# Patient Record
Sex: MALE | Race: WHITE | NOT HISPANIC OR LATINO | Employment: OTHER | URBAN - NONMETROPOLITAN AREA
[De-identification: names, ages, dates, MRNs, and addresses within clinical notes are randomized per-mention and may not be internally consistent; named-entity substitution may affect disease eponyms.]

---

## 2020-11-19 ENCOUNTER — OFFICE VISIT (OUTPATIENT)
Dept: FAMILY MEDICINE CLINIC | Facility: CLINIC | Age: 70
End: 2020-11-19

## 2020-11-19 ENCOUNTER — LAB (OUTPATIENT)
Dept: LAB | Facility: HOSPITAL | Age: 70
End: 2020-11-19

## 2020-11-19 VITALS
DIASTOLIC BLOOD PRESSURE: 70 MMHG | WEIGHT: 22.4 LBS | HEIGHT: 74 IN | OXYGEN SATURATION: 97 % | TEMPERATURE: 98 F | SYSTOLIC BLOOD PRESSURE: 128 MMHG | HEART RATE: 75 BPM | BODY MASS INDEX: 2.87 KG/M2

## 2020-11-19 DIAGNOSIS — I48.11 LONGSTANDING PERSISTENT ATRIAL FIBRILLATION (HCC): ICD-10-CM

## 2020-11-19 DIAGNOSIS — I10 ESSENTIAL HYPERTENSION: ICD-10-CM

## 2020-11-19 DIAGNOSIS — Z12.5 SCREENING FOR PROSTATE CANCER: ICD-10-CM

## 2020-11-19 DIAGNOSIS — E78.5 HYPERLIPIDEMIA, UNSPECIFIED HYPERLIPIDEMIA TYPE: ICD-10-CM

## 2020-11-19 DIAGNOSIS — Z12.11 ENCOUNTER FOR SCREENING FECAL OCCULT BLOOD TESTING: ICD-10-CM

## 2020-11-19 DIAGNOSIS — Z00.00 ANNUAL PHYSICAL EXAM: Primary | ICD-10-CM

## 2020-11-19 DIAGNOSIS — M25.511 ACUTE PAIN OF RIGHT SHOULDER: ICD-10-CM

## 2020-11-19 DIAGNOSIS — R19.4 CHANGE IN BOWEL HABITS: ICD-10-CM

## 2020-11-19 DIAGNOSIS — J44.9 CHRONIC OBSTRUCTIVE PULMONARY DISEASE, UNSPECIFIED COPD TYPE (HCC): ICD-10-CM

## 2020-11-19 DIAGNOSIS — F41.9 ANXIETY: ICD-10-CM

## 2020-11-19 DIAGNOSIS — E55.9 VITAMIN D DEFICIENCY: ICD-10-CM

## 2020-11-19 PROCEDURE — 99204 OFFICE O/P NEW MOD 45 MIN: CPT | Performed by: FAMILY MEDICINE

## 2020-11-19 PROCEDURE — 80061 LIPID PANEL: CPT | Performed by: FAMILY MEDICINE

## 2020-11-19 PROCEDURE — 82274 ASSAY TEST FOR BLOOD FECAL: CPT | Performed by: FAMILY MEDICINE

## 2020-11-19 PROCEDURE — 85027 COMPLETE CBC AUTOMATED: CPT | Performed by: FAMILY MEDICINE

## 2020-11-19 PROCEDURE — 80053 COMPREHEN METABOLIC PANEL: CPT | Performed by: FAMILY MEDICINE

## 2020-11-19 PROCEDURE — G0103 PSA SCREENING: HCPCS | Performed by: FAMILY MEDICINE

## 2020-11-19 RX ORDER — SERTRALINE HYDROCHLORIDE 100 MG/1
100 TABLET, FILM COATED ORAL DAILY
Qty: 90 TABLET | Refills: 3 | Status: SHIPPED | OUTPATIENT
Start: 2020-11-19 | End: 2021-12-10

## 2020-11-19 RX ORDER — LISINOPRIL 20 MG/1
10 TABLET ORAL DAILY
COMMUNITY
End: 2023-01-01

## 2020-11-19 RX ORDER — CYANOCOBALAMIN (VITAMIN B-12) 5000 MCG
5000 TABLET,IMMED, EXTENDED RELEASE, BIPHASIC ORAL DAILY
COMMUNITY
End: 2023-01-01

## 2020-11-19 RX ORDER — ASCORBIC ACID 500 MG
1000 TABLET ORAL DAILY
COMMUNITY

## 2020-11-19 RX ORDER — L.RHAMNOSUS/B.ANIMALIS(LACTIS) 3B CELL
1 CAPSULE ORAL DAILY
Qty: 30 CAPSULE | Refills: 11 | Status: SHIPPED | OUTPATIENT
Start: 2020-11-19 | End: 2023-01-01

## 2020-11-19 RX ORDER — SERTRALINE HYDROCHLORIDE 100 MG/1
100 TABLET, FILM COATED ORAL DAILY
COMMUNITY
End: 2020-11-19 | Stop reason: SDUPTHER

## 2020-11-19 RX ORDER — CHLORAL HYDRATE 500 MG
1000 CAPSULE ORAL
COMMUNITY
End: 2023-01-01

## 2020-11-19 RX ORDER — DILTIAZEM HYDROCHLORIDE 120 MG/1
120 CAPSULE, EXTENDED RELEASE ORAL DAILY
COMMUNITY

## 2020-11-19 RX ORDER — SIMVASTATIN 80 MG
40 TABLET ORAL NIGHTLY
COMMUNITY

## 2020-11-19 RX ORDER — BUDESONIDE AND FORMOTEROL FUMARATE DIHYDRATE 160; 4.5 UG/1; UG/1
2 AEROSOL RESPIRATORY (INHALATION)
COMMUNITY

## 2020-11-19 NOTE — PROGRESS NOTES
Marian VANEGAS Case is a 70 y.o. male.     Chief Complaint   Patient presents with   • Establish Care             History of Present Illness     New patient,  and has been going to [place] to see a provider.     He has hyperlipidemia, htn, a-fib on xarelto, anxiety, copd and vit d deficiency.     Psh:  Some injection procedure to lumbar disc  Sh:  Quit smoking 15 years ago.  He smoked 1.5ppd for 47 years. No etoh, drugs.  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   Fh:  Denies prostate cancer and colon cancer.  Dad  age 72 heart disease, mom  age 80's just worn out.     Thrown off 4 miramontes 2 weeks ago, some right shoulder discomfort.      He believes colonoscopy greater than 10 years ago.    Few weeks, diarrhea or constipation.         Review of Systems   Constitutional: Negative for chills, fatigue and fever.   HENT: Negative for congestion, ear discharge, ear pain, facial swelling, hearing loss, postnasal drip, rhinorrhea, sinus pressure, sore throat, trouble swallowing and voice change.    Eyes: Negative for discharge, redness and visual disturbance.   Respiratory: Negative for cough, chest tightness, shortness of breath and wheezing.    Cardiovascular: Negative for chest pain and palpitations.   Gastrointestinal: Negative for abdominal pain, blood in stool, constipation, diarrhea, nausea and vomiting.   Endocrine: Negative for polydipsia and polyuria.   Genitourinary: Negative for dysuria, flank pain, hematuria and urgency.   Musculoskeletal: Positive for arthralgias. Negative for back pain, joint swelling and myalgias.   Skin: Negative for rash.   Neurological: Negative for dizziness, weakness, numbness and headaches.   Hematological: Negative for adenopathy.   Psychiatric/Behavioral: Negative for confusion and sleep disturbance. The patient is not nervous/anxious.            /70 (BP Location: Left arm, Patient Position:  "Sitting, Cuff Size: Adult)   Pulse 75   Temp 98 °F (36.7 °C) (Infrared)   Ht 188 cm (74\")   Wt 10.2 kg (22 lb 6.4 oz)   SpO2 97%   BMI 2.88 kg/m²       Objective     Physical Exam  Vitals signs and nursing note reviewed.   Constitutional:       Appearance: Normal appearance. He is well-developed.   HENT:      Head: Normocephalic and atraumatic.      Right Ear: External ear normal.      Left Ear: External ear normal.      Nose: Nose normal. No rhinorrhea.   Eyes:      General: No scleral icterus.     Extraocular Movements: Extraocular movements intact.      Conjunctiva/sclera: Conjunctivae normal.      Pupils: Pupils are equal, round, and reactive to light.   Neck:      Musculoskeletal: Normal range of motion and neck supple.   Cardiovascular:      Rate and Rhythm: Normal rate and regular rhythm.      Heart sounds: Normal heart sounds. No friction rub. No gallop.    Pulmonary:      Effort: Pulmonary effort is normal.      Breath sounds: Normal breath sounds.   Abdominal:      General: Bowel sounds are normal. There is no distension.      Palpations: Abdomen is soft.      Tenderness: There is no abdominal tenderness.   Musculoskeletal: Normal range of motion.         General: No deformity.      Comments: Right ac joint tenderness palpation.  Has full rom.    Skin:     General: Skin is warm and dry.      Findings: No erythema or rash.   Neurological:      General: No focal deficit present.      Mental Status: He is alert and oriented to person, place, and time.      Cranial Nerves: No cranial nerve deficit.   Psychiatric:         Behavior: Behavior normal.         Thought Content: Thought content normal.         Judgment: Judgment normal.             PAST MEDICAL HISTORY   No past medical history on file.   PAST SURGICAL HISTORY   No past surgical history on file.   SOCIAL HISTORY     Social History     Socioeconomic History   • Marital status:      Spouse name: Not on file   • Number of children: Not on " file   • Years of education: Not on file   • Highest education level: Not on file   Tobacco Use   • Smoking status: Former Smoker     Packs/day: 1.50     Years: 47.00     Pack years: 70.50     Start date: 1958     Quit date: 2005     Years since quitting: 15.8      ALLERGIES   Morphine   MEDICATIONS     Current Outpatient Medications   Medication Sig Dispense Refill   • budesonide-formoterol (SYMBICORT) 160-4.5 MCG/ACT inhaler Inhale 2 puffs 2 (Two) Times a Day.     • cholecalciferol (VITAMIN D3) 1.25 MG (27062 UT) capsule Take 50,000 Units by mouth Daily.     • Cyanocobalamin ER (B-12 Dual Spectrum) 5000 MCG tablet controlled-release Take 5,000 mcg by mouth Daily.     • dilTIAZem (TIAZAC) 120 MG 24 hr capsule Take 120 mg by mouth Daily.     • lisinopril (PRINIVIL,ZESTRIL) 20 MG tablet Take 10 mg by mouth Daily.     • Omega-3 Fatty Acids (fish oil) 1000 MG capsule capsule Take 1,000 mg by mouth Daily With Breakfast.     • rivaroxaban (XARELTO) 20 MG tablet Take 20 mg by mouth Daily.     • sertraline (ZOLOFT) 100 MG tablet Take 1 tablet by mouth Daily. 90 tablet 3   • simvastatin (ZOCOR) 80 MG tablet Take 40 mg by mouth Every Night.     • tiotropium (SPIRIVA) 18 MCG per inhalation capsule Place 1 capsule into inhaler and inhale Daily.     • vitamin C (ASCORBIC ACID) 500 MG tablet Take 1,000 mg by mouth Daily.     • Probiotic Product ([employer]) capsule Take 1 capsule by mouth Daily. 30 capsule 11     No current facility-administered medications for this visit.         The following portions of the patient's history were reviewed and updated as appropriate: allergies, current medications, past family history, past medical history, past social history, past surgical history and problem list.        Assessment/Plan   Diagnoses and all orders for this visit:    1. Annual physical exam (Primary)  -     CBC (No Diff)  -     Comprehensive Metabolic Panel  -     Lipid Panel  -     PSA Screen    2. Screening for  prostate cancer  -     PSA Screen    3. Hyperlipidemia, unspecified hyperlipidemia type  -     Lipid Panel    4. Essential hypertension  -     CBC (No Diff)  -     Comprehensive Metabolic Panel    5. Longstanding persistent atrial fibrillation (CMS/HCC)    6. Anxiety    7. Chronic obstructive pulmonary disease, unspecified COPD type (CMS/HCC)    8. Vitamin D deficiency    9. Acute pain of right shoulder    10. Change in bowel habits    Other orders  -     sertraline (ZOLOFT) 100 MG tablet; Take 1 tablet by mouth Daily.  Dispense: 90 tablet; Refill: 3  -     Probiotic Product ([employer]) capsule; Take 1 capsule by mouth Daily.  Dispense: 30 capsule; Refill: 11      I suspect right ac joint arthritis.  More time to improve.  If not, refer to ortho.     STOOL CARD GIVEN AND EXPLAINED WHAT IT IS FOR.      For his change in bowel habits, try S*Bio.  Explained it alone would be a reason for a colonoscopy                No follow-ups on file.                  This document has been electronically signed by Ethan Harris MD on November 19, 2020 15:55 CST

## 2020-11-20 DIAGNOSIS — E83.52 HYPERCALCEMIA: Primary | ICD-10-CM

## 2020-11-20 LAB
ALBUMIN SERPL-MCNC: 3.7 G/DL (ref 3.5–5.2)
ALBUMIN/GLOB SERPL: 1 G/DL
ALP SERPL-CCNC: 71 U/L (ref 39–117)
ALT SERPL W P-5'-P-CCNC: 17 U/L (ref 1–41)
ANION GAP SERPL CALCULATED.3IONS-SCNC: 10.7 MMOL/L (ref 5–15)
AST SERPL-CCNC: 21 U/L (ref 1–40)
BILIRUB SERPL-MCNC: 0.2 MG/DL (ref 0–1.2)
BUN SERPL-MCNC: 24 MG/DL (ref 8–23)
BUN/CREAT SERPL: 18.8 (ref 7–25)
CALCIUM SPEC-SCNC: 10.7 MG/DL (ref 8.6–10.5)
CHLORIDE SERPL-SCNC: 104 MMOL/L (ref 98–107)
CHOLEST SERPL-MCNC: 165 MG/DL (ref 0–200)
CO2 SERPL-SCNC: 26.3 MMOL/L (ref 22–29)
CREAT SERPL-MCNC: 1.28 MG/DL (ref 0.76–1.27)
DEPRECATED RDW RBC AUTO: 42.9 FL (ref 37–54)
ERYTHROCYTE [DISTWIDTH] IN BLOOD BY AUTOMATED COUNT: 13.1 % (ref 12.3–15.4)
GFR SERPL CREATININE-BSD FRML MDRD: 55 ML/MIN/1.73
GLOBULIN UR ELPH-MCNC: 3.8 GM/DL
GLUCOSE SERPL-MCNC: 136 MG/DL (ref 65–99)
HCT VFR BLD AUTO: 41.9 % (ref 37.5–51)
HDLC SERPL-MCNC: 43 MG/DL (ref 40–60)
HGB BLD-MCNC: 14.4 G/DL (ref 13–17.7)
LDLC SERPL CALC-MCNC: 94 MG/DL (ref 0–100)
LDLC/HDLC SERPL: 2.1 {RATIO}
MCH RBC QN AUTO: 30.8 PG (ref 26.6–33)
MCHC RBC AUTO-ENTMCNC: 34.4 G/DL (ref 31.5–35.7)
MCV RBC AUTO: 89.5 FL (ref 79–97)
PLATELET # BLD AUTO: 242 10*3/MM3 (ref 140–450)
PMV BLD AUTO: 9.4 FL (ref 6–12)
POTASSIUM SERPL-SCNC: 3.7 MMOL/L (ref 3.5–5.2)
PROT SERPL-MCNC: 7.5 G/DL (ref 6–8.5)
PSA SERPL-MCNC: <0.014 NG/ML (ref 0–4)
RBC # BLD AUTO: 4.68 10*6/MM3 (ref 4.14–5.8)
SODIUM SERPL-SCNC: 141 MMOL/L (ref 136–145)
TRIGL SERPL-MCNC: 159 MG/DL (ref 0–150)
VLDLC SERPL-MCNC: 28 MG/DL (ref 5–40)
WBC # BLD AUTO: 6.61 10*3/MM3 (ref 3.4–10.8)

## 2020-12-23 DIAGNOSIS — R19.5 POSITIVE FIT (FECAL IMMUNOCHEMICAL TEST): Primary | ICD-10-CM

## 2020-12-23 LAB — HEMOCCULT STL QL IA: POSITIVE

## 2021-01-15 ENCOUNTER — LAB (OUTPATIENT)
Dept: LAB | Facility: HOSPITAL | Age: 71
End: 2021-01-15

## 2021-01-15 ENCOUNTER — OFFICE VISIT (OUTPATIENT)
Dept: GASTROENTEROLOGY | Facility: CLINIC | Age: 71
End: 2021-01-15

## 2021-01-15 VITALS
HEART RATE: 76 BPM | WEIGHT: 220.6 LBS | SYSTOLIC BLOOD PRESSURE: 108 MMHG | HEIGHT: 74 IN | DIASTOLIC BLOOD PRESSURE: 60 MMHG | BODY MASS INDEX: 28.31 KG/M2

## 2021-01-15 DIAGNOSIS — R19.4 CHANGE IN BOWEL HABITS: ICD-10-CM

## 2021-01-15 DIAGNOSIS — E83.52 HYPERCALCEMIA: Primary | ICD-10-CM

## 2021-01-15 DIAGNOSIS — R19.4 CHANGE IN BOWEL HABITS: Primary | ICD-10-CM

## 2021-01-15 DIAGNOSIS — E83.52 HYPERCALCEMIA: ICD-10-CM

## 2021-01-15 DIAGNOSIS — R19.5 POSITIVE FIT (FECAL IMMUNOCHEMICAL TEST): ICD-10-CM

## 2021-01-15 LAB — 25(OH)D3 SERPL-MCNC: 69.6 NG/ML (ref 30–100)

## 2021-01-15 PROCEDURE — 82306 VITAMIN D 25 HYDROXY: CPT

## 2021-01-15 PROCEDURE — 36415 COLL VENOUS BLD VENIPUNCTURE: CPT

## 2021-01-15 PROCEDURE — 99214 OFFICE O/P EST MOD 30 MIN: CPT | Performed by: INTERNAL MEDICINE

## 2021-01-15 RX ORDER — SODIUM, POTASSIUM,MAG SULFATES 17.5-3.13G
1 SOLUTION, RECONSTITUTED, ORAL ORAL EVERY 12 HOURS
Qty: 1 BOTTLE | Refills: 0 | Status: ON HOLD | OUTPATIENT
Start: 2021-01-15 | End: 2021-01-25

## 2021-01-15 RX ORDER — DEXTROSE AND SODIUM CHLORIDE 5; .45 G/100ML; G/100ML
30 INJECTION, SOLUTION INTRAVENOUS CONTINUOUS PRN
Status: CANCELLED | OUTPATIENT
Start: 2021-01-25

## 2021-01-15 NOTE — H&P (VIEW-ONLY)
[facility]      Chief Complaint:   Chief Complaint   Patient presents with   • POSITIVE FIT REF       Subjective     HPI:   Patient states change in bowel habits over the past 1 month with episodes of diarrhea and constipation.  Patient has had a colonoscopy greater than 10 years ago.  Patient had a fit test done by his primary doctor which was positive.    Plan; we will schedule patient for a colonoscopy next Friday.    Past Medical History:   History reviewed. No pertinent past medical history.    Past Surgical History:  Past Surgical History:   Procedure Laterality Date   • BACK SURGERY  1975       Family History:  History reviewed. No pertinent family history.    Social History:   reports that he quit smoking about 16 years ago. He started smoking about 63 years ago. He has a 70.50 pack-year smoking history. He does not have any smokeless tobacco history on file.    Medications:   Prior to Admission medications    Medication Sig Start Date End Date Taking? Authorizing Provider   budesonide-formoterol (SYMBICORT) 160-4.5 MCG/ACT inhaler Inhale 2 puffs 2 (Two) Times a Day.   Yes Alejandro Suggs MD   cholecalciferol (VITAMIN D3) 1.25 MG (67941 UT) capsule Take 50,000 Units by mouth Daily.   Yes Alejandro Suggs MD   Cyanocobalamin ER (B-12 Dual Spectrum) 5000 MCG tablet controlled-release Take 5,000 mcg by mouth Daily.   Yes Alejandro Suggs MD   dilTIAZem (TIAZAC) 120 MG 24 hr capsule Take 120 mg by mouth Daily.   Yes Alejandro Suggs MD   lisinopril (PRINIVIL,ZESTRIL) 20 MG tablet Take 10 mg by mouth Daily.   Yes Alejandro Suggs MD   Omega-3 Fatty Acids (fish oil) 1000 MG capsule capsule Take 1,000 mg by mouth Daily With Breakfast.   Yes Alejandro Suggs MD   Probiotic Product (Bulb) capsule Take 1 capsule by mouth Daily. 11/19/20  Yes Ethan Harris MD   rivaroxaban (XARELTO) 20 MG tablet Take 20 mg by mouth Daily.   Yes Es  "MD Alejandro   sertraline (ZOLOFT) 100 MG tablet Take 1 tablet by mouth Daily. 11/19/20  Yes Ethan Harris MD   simvastatin (ZOCOR) 80 MG tablet Take 40 mg by mouth Every Night.   Yes ProviderAlejandro MD   tiotropium (SPIRIVA) 18 MCG per inhalation capsule Place 1 capsule into inhaler and inhale Daily.   Yes ProviderAlejandro MD   vitamin C (ASCORBIC ACID) 500 MG tablet Take 1,000 mg by mouth Daily.   Yes ProviderAlejandro MD   sodium-potassium-magnesium sulfates (SUPREP) 17.5-3.13-1.6 GM/177ML solution oral solution Take 1 bottle by mouth Every 12 (Twelve) Hours. 1/15/21   Micah Adkins MD       Allergies:  Morphine    ROS:    Review of Systems   Constitutional: Negative for activity change, appetite change and unexpected weight change.   HENT: Negative for congestion, sore throat and trouble swallowing.    Respiratory: Negative for cough, choking and shortness of breath.    Cardiovascular: Negative for chest pain.   Gastrointestinal: Negative for abdominal distention, abdominal pain, anal bleeding, blood in stool, constipation, diarrhea, nausea, rectal pain and vomiting.   Endocrine: Negative for heat intolerance, polydipsia and polyphagia.   Genitourinary: Negative for difficulty urinating.   Musculoskeletal: Negative for arthralgias.   Skin: Negative for color change, pallor, rash and wound.   Allergic/Immunologic: Negative for food allergies.   Neurological: Negative for dizziness, syncope, weakness and headaches.   Psychiatric/Behavioral: Negative for agitation, behavioral problems, confusion and decreased concentration.     Objective     Blood pressure 108/60, pulse 76, height 188 cm (74\"), weight 100 kg (220 lb 9.6 oz).    Physical Exam  Constitutional:       General: He is not in acute distress.     Appearance: He is well-developed. He is not diaphoretic.   HENT:      Head: Normocephalic and atraumatic.   Cardiovascular:      Rate and Rhythm: Normal rate and regular rhythm.      Heart " sounds: Normal heart sounds. No murmur. No friction rub. No gallop.    Pulmonary:      Effort: No respiratory distress.      Breath sounds: Normal breath sounds. No wheezing or rales.   Chest:      Chest wall: No tenderness.   Abdominal:      General: Bowel sounds are normal. There is no distension.      Palpations: Abdomen is soft. There is no mass.      Tenderness: There is no abdominal tenderness. There is no guarding or rebound.      Hernia: No hernia is present.   Musculoskeletal: Normal range of motion.   Skin:     General: Skin is warm and dry.      Coloration: Skin is not pale.      Findings: No erythema or rash.   Neurological:      Mental Status: He is alert and oriented to person, place, and time.   Psychiatric:         Behavior: Behavior normal.         Thought Content: Thought content normal.         Judgment: Judgment normal.          Assessment/Plan   Diagnoses and all orders for this visit:    1. Change in bowel habits (Primary)  -     Case Request; Standing  -     dextrose 5 % and sodium chloride 0.45 % infusion  -     Case Request    2. Positive FIT (fecal immunochemical test)  -     Case Request; Standing  -     dextrose 5 % and sodium chloride 0.45 % infusion  -     Case Request    Other orders  -     Follow Anesthesia Guidelines / Standing Orders; Future  -     Obtain Informed Consent; Future  -     Implement Anesthesia Orders Day of Procedure; Standing  -     Obtain Informed Consent; Standing  -     POC Glucose Once; Standing  -     Insert Peripheral IV; Standing  -     sodium-potassium-magnesium sulfates (SUPREP) 17.5-3.13-1.6 GM/177ML solution oral solution; Take 1 bottle by mouth Every 12 (Twelve) Hours.  Dispense: 1 bottle; Refill: 0        COLONOSCOPY (N/A)     Diagnosis Plan   1. Change in bowel habits  Case Request    dextrose 5 % and sodium chloride 0.45 % infusion    Case Request   2. Positive FIT (fecal immunochemical test)  Case Request    dextrose 5 % and sodium chloride 0.45 %  infusion    Case Request       Anticipated Surgical Procedure:  Orders Placed This Encounter   Procedures   • Follow Anesthesia Guidelines / Standing Orders     Standing Status:   Future   • Obtain Informed Consent     Standing Status:   Future     Order Specific Question:   Informed Consent Given For     Answer:   colonoscopy       The risks, benefits, and alternatives of this procedure have been discussed with the patient or the responsible party- the patient understands and agrees to proceed.

## 2021-01-15 NOTE — PROGRESS NOTES
[facility], [place], [facility] by [facility]      Chief Complaint:   Chief Complaint   Patient presents with   • POSITIVE FIT REF       Subjective     HPI:   Patient states change in bowel habits over the past 1 month with episodes of diarrhea and constipation.  Patient has had a colonoscopy greater than 10 years ago.  Patient had a fit test done by his primary doctor which was positive.    Plan; we will schedule patient for a colonoscopy next Friday.    Past Medical History:   History reviewed. No pertinent past medical history.    Past Surgical History:  Past Surgical History:   Procedure Laterality Date   • BACK SURGERY  1975       Family History:  History reviewed. No pertinent family history.    Social History:   reports that he quit smoking about 16 years ago. He started smoking about 63 years ago. He has a 70.50 pack-year smoking history. He does not have any smokeless tobacco history on file.    Medications:   Prior to Admission medications    Medication Sig Start Date End Date Taking? Authorizing Provider   budesonide-formoterol (SYMBICORT) 160-4.5 MCG/ACT inhaler Inhale 2 puffs 2 (Two) Times a Day.   Yes Alejandro Suggs MD   cholecalciferol (VITAMIN D3) 1.25 MG (87716 UT) capsule Take 50,000 Units by mouth Daily.   Yes Alejandro Suggs MD   Cyanocobalamin ER (B-12 Dual Spectrum) 5000 MCG tablet controlled-release Take 5,000 mcg by mouth Daily.   Yes Alejandro Suggs MD   dilTIAZem (TIAZAC) 120 MG 24 hr capsule Take 120 mg by mouth Daily.   Yes Alejandro Suggs MD   lisinopril (PRINIVIL,ZESTRIL) 20 MG tablet Take 10 mg by mouth Daily.   Yes Alejandro Suggs MD   Omega-3 Fatty Acids (fish oil) 1000 MG capsule capsule Take 1,000 mg by mouth Daily With Breakfast.   Yes Alejandro Suggs MD   Probiotic Product (Melon) capsule Take 1 capsule by mouth Daily. 11/19/20  Yes Ethan Harris MD   rivaroxaban (XARELTO) 20 MG tablet Take 20 mg by mouth Daily.   Yes Es  "MD Alejandro   sertraline (ZOLOFT) 100 MG tablet Take 1 tablet by mouth Daily. 11/19/20  Yes Ethan Harris MD   simvastatin (ZOCOR) 80 MG tablet Take 40 mg by mouth Every Night.   Yes ProviderAlejandro MD   tiotropium (SPIRIVA) 18 MCG per inhalation capsule Place 1 capsule into inhaler and inhale Daily.   Yes ProviderAlejandro MD   vitamin C (ASCORBIC ACID) 500 MG tablet Take 1,000 mg by mouth Daily.   Yes ProviderAlejandro MD   sodium-potassium-magnesium sulfates (SUPREP) 17.5-3.13-1.6 GM/177ML solution oral solution Take 1 bottle by mouth Every 12 (Twelve) Hours. 1/15/21   Micah Adkins MD       Allergies:  Morphine    ROS:    Review of Systems   Constitutional: Negative for activity change, appetite change and unexpected weight change.   HENT: Negative for congestion, sore throat and trouble swallowing.    Respiratory: Negative for cough, choking and shortness of breath.    Cardiovascular: Negative for chest pain.   Gastrointestinal: Negative for abdominal distention, abdominal pain, anal bleeding, blood in stool, constipation, diarrhea, nausea, rectal pain and vomiting.   Endocrine: Negative for heat intolerance, polydipsia and polyphagia.   Genitourinary: Negative for difficulty urinating.   Musculoskeletal: Negative for arthralgias.   Skin: Negative for color change, pallor, rash and wound.   Allergic/Immunologic: Negative for food allergies.   Neurological: Negative for dizziness, syncope, weakness and headaches.   Psychiatric/Behavioral: Negative for agitation, behavioral problems, confusion and decreased concentration.     Objective     Blood pressure 108/60, pulse 76, height 188 cm (74\"), weight 100 kg (220 lb 9.6 oz).    Physical Exam  Constitutional:       General: He is not in acute distress.     Appearance: He is well-developed. He is not diaphoretic.   HENT:      Head: Normocephalic and atraumatic.   Cardiovascular:      Rate and Rhythm: Normal rate and regular rhythm.      Heart " sounds: Normal heart sounds. No murmur. No friction rub. No gallop.    Pulmonary:      Effort: No respiratory distress.      Breath sounds: Normal breath sounds. No wheezing or rales.   Chest:      Chest wall: No tenderness.   Abdominal:      General: Bowel sounds are normal. There is no distension.      Palpations: Abdomen is soft. There is no mass.      Tenderness: There is no abdominal tenderness. There is no guarding or rebound.      Hernia: No hernia is present.   Musculoskeletal: Normal range of motion.   Skin:     General: Skin is warm and dry.      Coloration: Skin is not pale.      Findings: No erythema or rash.   Neurological:      Mental Status: He is alert and oriented to person, place, and time.   Psychiatric:         Behavior: Behavior normal.         Thought Content: Thought content normal.         Judgment: Judgment normal.          Assessment/Plan   Diagnoses and all orders for this visit:    1. Change in bowel habits (Primary)  -     Case Request; Standing  -     dextrose 5 % and sodium chloride 0.45 % infusion  -     Case Request    2. Positive FIT (fecal immunochemical test)  -     Case Request; Standing  -     dextrose 5 % and sodium chloride 0.45 % infusion  -     Case Request    Other orders  -     Follow Anesthesia Guidelines / Standing Orders; Future  -     Obtain Informed Consent; Future  -     Implement Anesthesia Orders Day of Procedure; Standing  -     Obtain Informed Consent; Standing  -     POC Glucose Once; Standing  -     Insert Peripheral IV; Standing  -     sodium-potassium-magnesium sulfates (SUPREP) 17.5-3.13-1.6 GM/177ML solution oral solution; Take 1 bottle by mouth Every 12 (Twelve) Hours.  Dispense: 1 bottle; Refill: 0        COLONOSCOPY (N/A)     Diagnosis Plan   1. Change in bowel habits  Case Request    dextrose 5 % and sodium chloride 0.45 % infusion    Case Request   2. Positive FIT (fecal immunochemical test)  Case Request    dextrose 5 % and sodium chloride 0.45 %  infusion    Case Request       Anticipated Surgical Procedure:  Orders Placed This Encounter   Procedures   • Follow Anesthesia Guidelines / Standing Orders     Standing Status:   Future   • Obtain Informed Consent     Standing Status:   Future     Order Specific Question:   Informed Consent Given For     Answer:   colonoscopy       The risks, benefits, and alternatives of this procedure have been discussed with the patient or the responsible party- the patient understands and agrees to proceed.

## 2021-01-15 NOTE — PATIENT INSTRUCTIONS
"BMI for Adults  What is BMI?  Body mass index (BMI) is a number that is calculated from a person's weight and height. BMI can help estimate how much of a person's weight is composed of fat. BMI does not measure body fat directly. Rather, it is an alternative to procedures that directly measure body fat, which can be difficult and expensive.  BMI can help identify people who may be at higher risk for certain medical problems.  What are BMI measurements used for?  BMI is used as a screening tool to identify possible weight problems. It helps determine whether a person is obese, overweight, a healthy weight, or underweight.  BMI is useful for:  · Identifying a weight problem that may be related to a medical condition or may increase the risk for medical problems.  · Promoting changes, such as changes in diet and exercise, to help reach a healthy weight. BMI screening can be repeated to see if these changes are working.  How is BMI calculated?  BMI involves measuring your weight in relation to your height. Both height and weight are measured, and the BMI is calculated from those numbers. This can be done either in English (U.S[place] or metric measurements. Note that charts and online BMI calculators are available to help you find your BMI quickly and easily without having to do these calculations yourself.  To calculate your BMI in English (U.S[place] measurements:    1. Measure your weight in pounds (lb).  2. Multiply the number of pounds by 703.  ? For example, for a person who weighs 180 lb, multiply that number by 703, which equals 126,540.  3. Measure your height in inches. Then multiply that number by itself to get a measurement called \"inches squared.\"  ? For example, for a person who is 70 inches tall, the \"inches squared\" measurement is 70 inches x 70 inches, which equals 4,900 inches squared.  4. Divide the total from step 2 (number of lb x 703) by the total from step 3 (inches squared): 126,540 ÷ 4,900 = 25.8. This is " "your BMI.  To calculate your BMI in metric measurements:  1. Measure your weight in kilograms (kg).  2. Measure your height in meters (m). Then multiply that number by itself to get a measurement called \"meters squared.\"  ? For example, for a person who is 1.75 m tall, the \"meters squared\" measurement is 1.75 m x 1.75 m, which is equal to 3.1 meters squared.  3. Divide the number of kilograms (your weight) by the meters squared number. In this example: 70 ÷ 3.1 = 22.6. This is your BMI.  What do the results mean?  BMI charts are used to identify whether you are underweight, normal weight, overweight, or obese. The following guidelines will be used:  · Underweight: BMI less than 18.5.  · Normal weight: BMI between 18.5 and 24.9.  · Overweight: BMI between 25 and 29.9.  · Obese: BMI of 30 or above.  Keep these notes in mind:  · Weight includes both fat and muscle, so someone with a muscular build, such as an athlete, may have a BMI that is higher than 24.9. In cases like these, BMI is not an accurate measure of body fat.  · To determine if excess body fat is the cause of a BMI of 25 or higher, further assessments may need to be done by a health care provider.  · BMI is usually interpreted in the same way for men and women.  Where to find more information  For more information about BMI, including tools to quickly calculate your BMI, go to these websites:  · Centers for Disease Control and Prevention: www.cdc.gov  · American Heart Association: www.heart.org  · National Heart, Lung, and Blood Cost: www.nhlbi.nih.gov  Summary  · Body mass index (BMI) is a number that is calculated from a person's weight and height.  · BMI may help estimate how much of a person's weight is composed of fat. BMI can help identify those who may be at higher risk for certain medical problems.  · BMI can be measured using English measurements or metric measurements.  · BMI charts are used to identify whether you are underweight, normal " weight, overweight, or obese.  This information is not intended to replace advice given to you by your health care provider. Make sure you discuss any questions you have with your health care provider.  Document Revised: 09/09/2020 Document Reviewed: 07/17/2020  Elsevier Patient Education © 2020 [employer]

## 2021-01-18 DIAGNOSIS — E46 PROTEIN-CALORIE MALNUTRITION, UNSPECIFIED SEVERITY (HCC): Primary | ICD-10-CM

## 2021-01-18 DIAGNOSIS — E55.9 VITAMIN D DEFICIENCY: ICD-10-CM

## 2021-01-22 ENCOUNTER — LAB (OUTPATIENT)
Dept: LAB | Facility: HOSPITAL | Age: 71
End: 2021-01-22

## 2021-01-22 ENCOUNTER — APPOINTMENT (OUTPATIENT)
Dept: LAB | Facility: HOSPITAL | Age: 71
End: 2021-01-22

## 2021-01-22 DIAGNOSIS — Z01.818 PREOP TESTING: Primary | ICD-10-CM

## 2021-01-22 LAB — SARS-COV-2 ORF1AB RESP QL NAA+PROBE: NOT DETECTED

## 2021-01-22 PROCEDURE — C9803 HOPD COVID-19 SPEC COLLECT: HCPCS

## 2021-01-22 PROCEDURE — U0004 COV-19 TEST NON-CDC HGH THRU: HCPCS

## 2021-01-25 ENCOUNTER — ANESTHESIA (OUTPATIENT)
Dept: GASTROENTEROLOGY | Facility: HOSPITAL | Age: 71
End: 2021-01-25

## 2021-01-25 ENCOUNTER — ANESTHESIA EVENT (OUTPATIENT)
Dept: GASTROENTEROLOGY | Facility: HOSPITAL | Age: 71
End: 2021-01-25

## 2021-01-25 ENCOUNTER — HOSPITAL ENCOUNTER (OUTPATIENT)
Facility: HOSPITAL | Age: 71
Setting detail: HOSPITAL OUTPATIENT SURGERY
Discharge: HOME | End: 2021-01-25
Attending: INTERNAL MEDICINE | Admitting: INTERNAL MEDICINE

## 2021-01-25 VITALS
DIASTOLIC BLOOD PRESSURE: 65 MMHG | BODY MASS INDEX: 27.21 KG/M2 | SYSTOLIC BLOOD PRESSURE: 114 MMHG | WEIGHT: 212 LBS | HEART RATE: 86 BPM | OXYGEN SATURATION: 95 % | HEIGHT: 74 IN | TEMPERATURE: 97.8 F | RESPIRATION RATE: 14 BRPM

## 2021-01-25 DIAGNOSIS — R19.5 POSITIVE FIT (FECAL IMMUNOCHEMICAL TEST): ICD-10-CM

## 2021-01-25 DIAGNOSIS — R19.4 CHANGE IN BOWEL HABITS: ICD-10-CM

## 2021-01-25 PROCEDURE — 45378 DIAGNOSTIC COLONOSCOPY: CPT | Performed by: INTERNAL MEDICINE

## 2021-01-25 PROCEDURE — 25010000002 PROPOFOL 10 MG/ML EMULSION: Performed by: NURSE ANESTHETIST, CERTIFIED REGISTERED

## 2021-01-25 RX ORDER — DEXTROSE AND SODIUM CHLORIDE 5; .45 G/100ML; G/100ML
30 INJECTION, SOLUTION INTRAVENOUS CONTINUOUS PRN
Status: DISCONTINUED | OUTPATIENT
Start: 2021-01-25 | End: 2021-01-25 | Stop reason: HOSPADM

## 2021-01-25 RX ORDER — PROPOFOL 10 MG/ML
VIAL (ML) INTRAVENOUS AS NEEDED
Status: DISCONTINUED | OUTPATIENT
Start: 2021-01-25 | End: 2021-01-25 | Stop reason: SURG

## 2021-01-25 RX ORDER — LIDOCAINE HYDROCHLORIDE 20 MG/ML
INJECTION, SOLUTION INTRAVENOUS AS NEEDED
Status: DISCONTINUED | OUTPATIENT
Start: 2021-01-25 | End: 2021-01-25 | Stop reason: SURG

## 2021-01-25 RX ADMIN — LIDOCAINE HYDROCHLORIDE 60 MG: 20 INJECTION, SOLUTION INTRAVENOUS at 10:19

## 2021-01-25 RX ADMIN — DEXTROSE AND SODIUM CHLORIDE 30 ML/HR: 5; 450 INJECTION, SOLUTION INTRAVENOUS at 09:54

## 2021-01-25 RX ADMIN — PROPOFOL 20 MG: 10 INJECTION, EMULSION INTRAVENOUS at 10:25

## 2021-01-25 RX ADMIN — PROPOFOL 100 MG: 10 INJECTION, EMULSION INTRAVENOUS at 10:19

## 2021-01-25 RX ADMIN — PROPOFOL 20 MG: 10 INJECTION, EMULSION INTRAVENOUS at 10:22

## 2021-01-25 RX ADMIN — PROPOFOL 20 MG: 10 INJECTION, EMULSION INTRAVENOUS at 10:28

## 2021-01-25 NOTE — ANESTHESIA PREPROCEDURE EVALUATION
Anesthesia Evaluation     NPO Solid Status: > 8 hours  NPO Liquid Status: > 4 hours           Airway   Mallampati: II  TM distance: >3 FB  Neck ROM: full  Possible difficult intubation  Dental    (+) lower dentures and upper dentures    Pulmonary    (+) a smoker Former, COPD mild, decreased breath sounds,   Cardiovascular   Exercise tolerance: good (4-7 METS)    Rhythm: regular  Rate: normal    (-) pacemaker, hypertension, CAD      Neuro/Psych- negative ROS  GI/Hepatic/Renal/Endo    (-)  obesity, GERD    Musculoskeletal     (-) arthralgias  Abdominal    Substance History   (-) alcohol use     OB/GYN          Other                      Anesthesia Plan    ASA 2     MAC     intravenous induction     Anesthetic plan, all risks, benefits, and alternatives have been provided, discussed and informed consent has been obtained with: patient.

## 2021-01-25 NOTE — ANESTHESIA POSTPROCEDURE EVALUATION
Patient: Son Hughes Case    Procedure Summary     Date: 01/25/21 Room / Location: [facility] ENDOSCOPY 1 / [facility] ENDOSCOPY    Anesthesia Start: 1015 Anesthesia Stop: 1033    Procedure: COLONOSCOPY (N/A ) Diagnosis:       Change in bowel habits      Positive FIT (fecal immunochemical test)      (Change in bowel habits [R19.4])      (Positive FIT (fecal immunochemical test) [R19.5])    Surgeon: Micah Adkins MD Provider: Destiney Bell CRNA    Anesthesia Type: MAC ASA Status: 2          Anesthesia Type: MAC    Vitals  No vitals data found for the desired time range.          Post Anesthesia Care and Evaluation    Patient location during evaluation: bedside  Patient participation: waiting for patient participation  Level of consciousness: sleepy but conscious  Pain score: 0  Pain management: adequate  Airway patency: patent  Anesthetic complications: No anesthetic complications  PONV Status: none  Cardiovascular status: acceptable  Respiratory status: acceptable  Hydration status: acceptable

## 2021-09-02 ENCOUNTER — DOCUMENTATION (OUTPATIENT)
Dept: CARDIOLOGY | Facility: CLINIC | Age: 71
End: 2021-09-02

## 2021-09-02 ENCOUNTER — OFFICE VISIT (OUTPATIENT)
Dept: CARDIOLOGY | Facility: CLINIC | Age: 71
End: 2021-09-02

## 2021-09-02 VITALS
SYSTOLIC BLOOD PRESSURE: 116 MMHG | WEIGHT: 215 LBS | HEIGHT: 74 IN | HEART RATE: 75 BPM | OXYGEN SATURATION: 98 % | BODY MASS INDEX: 27.59 KG/M2 | DIASTOLIC BLOOD PRESSURE: 68 MMHG

## 2021-09-02 DIAGNOSIS — I48.91 ATRIAL FIBRILLATION, UNSPECIFIED TYPE (HCC): Primary | ICD-10-CM

## 2021-09-02 DIAGNOSIS — R06.09 DYSPNEA ON EXERTION: ICD-10-CM

## 2021-09-02 DIAGNOSIS — Z13.6 SCREENING FOR ABDOMINAL AORTIC ANEURYSM: ICD-10-CM

## 2021-09-02 DIAGNOSIS — I10 HYPERTENSION, UNSPECIFIED TYPE: ICD-10-CM

## 2021-09-02 PROCEDURE — 93000 ELECTROCARDIOGRAM COMPLETE: CPT | Performed by: INTERNAL MEDICINE

## 2021-09-02 PROCEDURE — 99204 OFFICE O/P NEW MOD 45 MIN: CPT | Performed by: INTERNAL MEDICINE

## 2021-09-02 NOTE — PROGRESS NOTES
[facility] Cardiology  OFFICE NOTE    Cardiovascular Medicine  Hernando Cao M.D., [facility]         Jyoti Burgos, APRN  [address] [place] [address]    Thank you for asking me to see Son Hughes Case for atrial fibrillation.    History of Present Illness    Son Brannon is a 71 y.o. male who presents for consultation today.  He reports history of hypertension, tobacco abuse (former, smoked 1 PPD of cigarettes for 35 years, quit 15 years ago), COPD, CKD and AAA (was told he had this many years ago, has not been reevaluated for many years).  [facility].    He denies having any prior history of open heart surgeries, coronary artery disease or any other rhythm problems other than atrial fibrillation.  Reports being diagnosed with atrial fibrillation many years ago and was started on Xarelto.  He has tolerated Xarelto without any significant bleeding problems.  He is on diltiazem for rate control.    He has developed significant exertional shortness of breath for the past year.  He is currently getting short of breath while walking up to his car from his house and even while going for a shower.  This promptly improved with rest.  He has not had any significant exertional chest discomfort.  He has not had any PND, orthopnea or leg swelling.  Denies having any syncopal episodes.  He tends to feel dizzy when he has a bout of coughing.    Review of Systems - ROS  Constitution: Negative for weakness, weight gain and weight loss.   HENT: Negative for congestion.    Eyes: Negative for blurred vision.   Cardiovascular: As mentioned above  Respiratory: Negative for cough and hemoptysis.    Endocrine: Negative for polydipsia and polyuria.   Hematologic/Lymphatic: Negative for bleeding problem. Does not bruise/bleed easily.   Skin: Negative for flushing.   Musculoskeletal: Negative for neck pain and stiffness.   Gastrointestinal: Negative for abdominal pain, diarrhea, jaundice, melena,  nausea and vomiting.   Genitourinary: Negative for dysuria and hematuria.   Neurological: Negative for focal weakness and numbness.   Psychiatric/Behavioral: Negative for altered mental status and depression.      All other systems were reviewed and were negative.    Past Medical History:   Diagnosis Date   • COPD (chronic obstructive pulmonary disease) (CMS/[facility])        Family History:  family history is not on file.    Social History:   reports that he quit smoking about 16 years ago. He started smoking about 63 years ago. He has a 70.50 pack-year smoking history. He has never used smokeless tobacco. He reports previous alcohol use. He reports that he does not use drugs.    Allergies:  Allergies   Allergen Reactions   • Contrast Dye Other (See Comments)     States made him sick   • Morphine Mental Status Change         Current Outpatient Medications:   •  budesonide-formoterol (SYMBICORT) 160-4.5 MCG/ACT inhaler, Inhale 2 puffs 2 (Two) Times a Day., Disp: , Rfl:   •  cholecalciferol (VITAMIN D3) 1.25 MG (22786 UT) capsule, Take 50,000 Units by mouth Daily., Disp: , Rfl:   •  Cyanocobalamin ER (B-12 Dual Spectrum) 5000 MCG tablet controlled-release, Take 5,000 mcg by mouth Daily., Disp: , Rfl:   •  dilTIAZem (TIAZAC) 120 MG 24 hr capsule, Take 120 mg by mouth Daily., Disp: , Rfl:   •  lisinopril (PRINIVIL,ZESTRIL) 20 MG tablet, Take 10 mg by mouth Daily., Disp: , Rfl:   •  Omega-3 Fatty Acids (fish oil) 1000 MG capsule capsule, Take 1,000 mg by mouth Daily With Breakfast., Disp: , Rfl:   •  Probiotic Product (Lumier) capsule, Take 1 capsule by mouth Daily., Disp: 30 capsule, Rfl: 11  •  rivaroxaban (XARELTO) 20 MG tablet, Take 20 mg by mouth Daily., Disp: , Rfl:   •  sertraline (ZOLOFT) 100 MG tablet, Take 1 tablet by mouth Daily., Disp: 90 tablet, Rfl: 3  •  simvastatin (ZOCOR) 80 MG tablet, Take 40 mg by mouth Every Night., Disp: , Rfl:   •  tiotropium (SPIRIVA) 18 MCG per inhalation capsule, Place  "1 capsule into inhaler and inhale Daily., Disp: , Rfl:   •  vitamin C (ASCORBIC ACID) 500 MG tablet, Take 1,000 mg by mouth Daily., Disp: , Rfl:     Physical Exam:  Vitals:    09/02/21 1024   BP: 116/68   BP Location: Left arm   Patient Position: Sitting   Cuff Size: Adult   Pulse: 75   SpO2: 98%   Weight: 97.5 kg (215 lb)   Height: 188 cm (74\")   PainSc: 0-No pain     Current Pain Level: none  Pulse Ox: Normal  on room air  General: alert, appears stated age and cooperative     Body Habitus: well-nourished    HEENT: Head: Normocephalic, no lesions, without obvious abnormality.    Neuro: alert, oriented x3  Pulses: 2+ and symmetric  JVP: Volume/Pulsation: Normal.  Normal waveforms.   Appropriate inspiratory decrease.    Carotid Exam: no bruit normal pulsation bilaterally   Carotid Volume: normal.     Respirations: no increased work of breathing   Chest:  Normal    Pulmonary:Normal   Precordium: Normal impulses. P2 is not palpable.  [place]:  normal size and placement  Palpable S4: absent.  Heart rate: normal    Heart Rhythm: regular     Heart Sounds: S1: normal  S2: normal  S3: absent   S4: absent  Opening Snap: absent    Pericardial Rub:  Absent: .    Abdomen:   Appearance: normal .  Palpation: Soft, non-tender to palpation, bowel sounds positive in all four quadrants; no guarding or rebound tenderness  Extremity: no edema.   LE Skin: no rashes  LE Hair:  normal  LE Pulses: well perfused with normal pulses in the distal extremities  Pallor on elevation: Absent. Rubor on dependency: None      DATA REVIEWED:     EKG. I personally reviewed and interpreted the EKG.  normal sinus rhythm, PAC's noted    ECG/EMG Results (all)     Procedure Component Value Units Date/Time    ECG 12 Lead [[id number]] Collected: 09/02/21 1017     Updated: 09/02/21 1043     QT Interval 358 ms      QTC Interval 405 ms     Narrative:      Test Reason : atrial fibrillation  Blood Pressure :   */*   mmHG  Vent. Rate :  77 BPM     Atrial Rate :  77 " BPM     P-R Int : 180 ms          QRS Dur :  76 ms      QT Int : 358 ms       P-R-T Axes :   6  39  41 degrees     QTc Int : 405 ms    Sinus rhythm with marked sinus arrhythmia  Otherwise normal ECG  No previous ECGs available    Referred By: SALOMÓN           Confirmed By:         ---------------------------------------------------  TTE/JD:    -----------------------------------------------------  CXR/Imaging:   Imaging Results (Most Recent)     None            -----------------------------------------------------  CT:   No radiology results for the last 30 days.    ----------------------------------------------------      --------------------------------------------------------------------------------------------------  LABS:     The 10-year CVD risk score (Fernanda et al., 2008) is: 24.7%    Values used to calculate the score:      Age: 71 years      Sex: Male      Diabetic: No      Tobacco smoker: No      Systolic Blood Pressure: 116 mmHg      Is BP treated: Yes      HDL Cholesterol: 43 mg/dL      Total Cholesterol: 165 mg/dL         Lab Results   Component Value Date    GLUCOSE 136 (H) 11/19/2020    BUN 24 (H) 11/19/2020    CREATININE 1.28 (H) 11/19/2020    EGFRIFNONA 55 (L) 11/19/2020    BCR 18.8 11/19/2020    K 3.7 11/19/2020    CO2 26.3 11/19/2020    CALCIUM 10.7 (H) 11/19/2020    ALBUMIN 3.70 11/19/2020    AST 21 11/19/2020    ALT 17 11/19/2020     Lab Results   Component Value Date    WBC 6.61 11/19/2020    HGB 14.4 11/19/2020    HCT 41.9 11/19/2020    MCV 89.5 11/19/2020     11/19/2020     Lab Results   Component Value Date    CHOL 165 11/19/2020    TRIG 159 (H) 11/19/2020    HDL 43 11/19/2020    LDL 94 11/19/2020     Lab Results   Component Value Date    TSH 2.02 08/10/2021     No results found for: CKTOTAL, CKMB, CKMBINDEX, TROPONINI, TROPONINT  No results found for: HGBA1C  No results found for: DDIMER  Lab Results   Component Value Date    ALT 17 11/19/2020     No results found for:  HGBA1C  Lab Results   Component Value Date    CREATININE 1.28 (H) 11/19/2020     No results found for: IRON, TIBC, FERRITIN  No results found for: INR, PROTIME    Assessment/Plan     1. Atrial fibrillation, unspecified type (CMS/HCC)  ECG done in clinic today showed normal sinus rhythm with PACs.  He was diagnosed with atrial fibrillation many years ago and was started on Xarelto.  He denies having any frequent palpitations.  We will obtain a transthoracic echocardiogram to look for underlying structural heart disease.  Serum TSH level was normal at 2.02 on 8/10/2021.  Continue diltiazem for rate control.  He is going to see pulmonary medicine at [facility] in the near future, and is going to ask them to evaluate him for KEVEN (he snores a lot at night).  His last creatinine level was 1.7 mg/dL on 8/10/2021, we will contact her pharmacy to determine the appropriate dose of Xarelto for him.  - ECG 12 Lead  - Adult Transthoracic Echo Complete w/ Color, Spectral and Contrast if Necessary Per Protocol; Future    2. Dyspnea on exertion  He has developed significant exertional dyspnea over the past year.  This occurs when he walks from his house to the car and even when he goes to take a shower.  We will obtain a transthoracic echocardiogram to look for underlying structural heart disease.  Will also obtain an exercise treadmill stress test to look for evidence of underlying myocardial ischemia.  - Treadmill Stress Test; Future  - Adult Transthoracic Echo Complete w/ Color, Spectral and Contrast if Necessary Per Protocol; Future    3. Hypertension, unspecified type  Clinic BP was 116/68 mmHg today.  We will continue current regimen of lisinopril.      4. Screening for abdominal aortic aneurysm  He was told he had an abdominal aortic aneurysm many years ago, this has not been reevaluated for the past several years.  Will obtain a screening ultrasound for AAA assessment.  - US AAA Screen Limited;  Future      Prevention:  Patient's Body mass index is 27.6 kg/m². indicating that he is overweight (BMI 25-29.9). We discussed portion control and increasing exercise..      Son Hughes Case  reports that he quit smoking about 16 years ago. He started smoking about 63 years ago. He has a 70.50 pack-year smoking history. He has never used smokeless tobacco.. I have educated him on the risk of diseases from using tobacco products such as cancer, COPD and heart disease.     I have congratulated him on successfully staying away from smoking for the past 15 years.      Return in about 3 weeks (around 9/23/2021).            Electronically signed by Hernando Cao MD on 09/02/21 at 11:06 CDT

## 2021-09-07 ENCOUNTER — HOSPITAL ENCOUNTER (OUTPATIENT)
Dept: ULTRASOUND IMAGING | Facility: HOSPITAL | Age: 71
Discharge: HOME | End: 2021-09-07
Admitting: INTERNAL MEDICINE

## 2021-09-07 ENCOUNTER — TELEPHONE (OUTPATIENT)
Dept: CARDIOLOGY | Facility: CLINIC | Age: 71
End: 2021-09-07

## 2021-09-07 DIAGNOSIS — I71.40 AAA (ABDOMINAL AORTIC ANEURYSM) WITHOUT RUPTURE (HCC): Primary | ICD-10-CM

## 2021-09-07 DIAGNOSIS — Z13.6 SCREENING FOR ABDOMINAL AORTIC ANEURYSM: ICD-10-CM

## 2021-09-07 LAB
QT INTERVAL: 358 MS
QTC INTERVAL: 405 MS

## 2021-09-07 PROCEDURE — 76706 US ABDL AORTA SCREEN AAA: CPT

## 2021-09-07 NOTE — PROGRESS NOTES
Please call and let him know that his abdominal aortic aneurysm is currently 4.7 cm in size, he needs to see vascular surgery in clinic. Please put in a referral for vascular surgery as well.

## 2021-09-07 NOTE — TELEPHONE ENCOUNTER
Called patient and informed him of results and referral. Patient voiced his understandings.         ----- Message from Hernando Cao MD sent at 9/7/2021 11:06 AM CDT -----  Please call and let him know that his abdominal aortic aneurysm is currently 4.7 cm in size, he needs to see vascular surgery in clinic. Please put in a referral for vascular surgery as well.

## 2021-12-06 RX ORDER — SERTRALINE HYDROCHLORIDE 100 MG/1
TABLET, FILM COATED ORAL
Qty: 90 TABLET | Refills: 0 | OUTPATIENT
Start: 2021-12-06

## 2021-12-10 RX ORDER — SERTRALINE HYDROCHLORIDE 100 MG/1
TABLET, FILM COATED ORAL
Qty: 30 TABLET | Refills: 0 | Status: SHIPPED | OUTPATIENT
Start: 2021-12-10

## 2022-01-11 RX ORDER — SERTRALINE HYDROCHLORIDE 100 MG/1
TABLET, FILM COATED ORAL
Qty: 90 TABLET | Refills: 0 | OUTPATIENT
Start: 2022-01-11

## 2022-12-08 NOTE — PROGRESS NOTES
Called patient's pharmacy per Nash for a creatine clearance. Patients level is at 1.7. pharmacist said there was no dosage changes needed for tat level.   
No

## 2023-01-01 ENCOUNTER — OUTSIDE FACILITY SERVICE (OUTPATIENT)
Dept: PULMONOLOGY | Facility: CLINIC | Age: 73
End: 2023-01-01
Payer: MEDICARE

## 2023-01-01 ENCOUNTER — APPOINTMENT (OUTPATIENT)
Dept: GENERAL RADIOLOGY | Facility: HOSPITAL | Age: 73
DRG: 177 | End: 2023-01-01
Payer: MEDICARE

## 2023-01-01 ENCOUNTER — HOSPITAL ENCOUNTER (INPATIENT)
Facility: HOSPITAL | Age: 73
LOS: 6 days | Discharge: NURSING, REHAB OR LONG-TERM CARE | DRG: 177 | End: 2023-03-03
Attending: STUDENT IN AN ORGANIZED HEALTH CARE EDUCATION/TRAINING PROGRAM | Admitting: HOSPITALIST
Payer: MEDICARE

## 2023-01-01 ENCOUNTER — APPOINTMENT (OUTPATIENT)
Dept: GENERAL RADIOLOGY | Facility: HOSPITAL | Age: 73
End: 2023-01-01
Payer: COMMERCIAL

## 2023-01-01 ENCOUNTER — APPOINTMENT (OUTPATIENT)
Dept: CT IMAGING | Facility: HOSPITAL | Age: 73
DRG: 177 | End: 2023-01-01
Payer: MEDICARE

## 2023-01-01 ENCOUNTER — HOSPITAL ENCOUNTER (OUTPATIENT)
Facility: HOSPITAL | Age: 73
End: 2023-03-05
Attending: INTERNAL MEDICINE | Admitting: INTERNAL MEDICINE
Payer: COMMERCIAL

## 2023-01-01 VITALS
TEMPERATURE: 97.4 F | BODY MASS INDEX: 26.44 KG/M2 | SYSTOLIC BLOOD PRESSURE: 126 MMHG | HEART RATE: 74 BPM | RESPIRATION RATE: 22 BRPM | HEIGHT: 74 IN | WEIGHT: 206 LBS | DIASTOLIC BLOOD PRESSURE: 55 MMHG | OXYGEN SATURATION: 89 %

## 2023-01-01 DIAGNOSIS — Z74.09 IMPAIRED FUNCTIONAL MOBILITY, BALANCE, GAIT, AND ENDURANCE: ICD-10-CM

## 2023-01-01 DIAGNOSIS — U07.1 COVID-19: Primary | ICD-10-CM

## 2023-01-01 LAB
ALBUMIN SERPL-MCNC: 3.1 G/DL (ref 3.5–5.2)
ALBUMIN SERPL-MCNC: 3.2 G/DL (ref 3.5–5.2)
ALBUMIN SERPL-MCNC: 3.3 G/DL (ref 3.5–5.2)
ALBUMIN SERPL-MCNC: 3.6 G/DL (ref 3.5–5.2)
ALBUMIN/GLOB SERPL: 0.9 G/DL
ALBUMIN/GLOB SERPL: 1 G/DL
ALBUMIN/GLOB SERPL: 1.1 G/DL
ALBUMIN/GLOB SERPL: 1.2 G/DL
ALP SERPL-CCNC: 67 U/L (ref 39–117)
ALP SERPL-CCNC: 68 U/L (ref 39–117)
ALP SERPL-CCNC: 78 U/L (ref 39–117)
ALP SERPL-CCNC: 79 U/L (ref 39–117)
ALP SERPL-CCNC: 80 U/L (ref 39–117)
ALP SERPL-CCNC: 96 U/L (ref 39–117)
ALT SERPL W P-5'-P-CCNC: 103 U/L (ref 1–41)
ALT SERPL W P-5'-P-CCNC: 15 U/L (ref 1–41)
ALT SERPL W P-5'-P-CCNC: 15 U/L (ref 1–41)
ALT SERPL W P-5'-P-CCNC: 26 U/L (ref 1–41)
ALT SERPL W P-5'-P-CCNC: 30 U/L (ref 1–41)
ALT SERPL W P-5'-P-CCNC: 32 U/L (ref 1–41)
ANION GAP SERPL CALCULATED.3IONS-SCNC: 10 MMOL/L (ref 5–15)
ANION GAP SERPL CALCULATED.3IONS-SCNC: 11 MMOL/L (ref 5–15)
ANION GAP SERPL CALCULATED.3IONS-SCNC: 11 MMOL/L (ref 5–15)
ANION GAP SERPL CALCULATED.3IONS-SCNC: 12 MMOL/L (ref 5–15)
ANION GAP SERPL CALCULATED.3IONS-SCNC: 8 MMOL/L (ref 5–15)
ANION GAP SERPL CALCULATED.3IONS-SCNC: 9 MMOL/L (ref 5–15)
ARTERIAL PATENCY WRIST A: ABNORMAL
AST SERPL-CCNC: 17 U/L (ref 1–40)
AST SERPL-CCNC: 19 U/L (ref 1–40)
AST SERPL-CCNC: 26 U/L (ref 1–40)
AST SERPL-CCNC: 30 U/L (ref 1–40)
AST SERPL-CCNC: 36 U/L (ref 1–40)
AST SERPL-CCNC: 38 U/L (ref 1–40)
ATMOSPHERIC PRESS: 732 MMHG
ATMOSPHERIC PRESS: 732 MMHG
ATMOSPHERIC PRESS: 746 MMHG
BACTERIA SPEC AEROBE CULT: NORMAL
BACTERIA UR QL AUTO: ABNORMAL /HPF
BASE EXCESS BLDA CALC-SCNC: -2 MMOL/L (ref 0–2)
BASE EXCESS BLDA CALC-SCNC: -2.6 MMOL/L (ref 0–2)
BASE EXCESS BLDA CALC-SCNC: -2.9 MMOL/L (ref 0–2)
BASOPHILS # BLD AUTO: 0 10*3/MM3 (ref 0–0.2)
BASOPHILS # BLD AUTO: 0 10*3/MM3 (ref 0–0.2)
BASOPHILS # BLD AUTO: 0.01 10*3/MM3 (ref 0–0.2)
BASOPHILS # BLD AUTO: 0.02 10*3/MM3 (ref 0–0.2)
BASOPHILS # BLD AUTO: 0.02 10*3/MM3 (ref 0–0.2)
BASOPHILS # BLD AUTO: 0.04 10*3/MM3 (ref 0–0.2)
BASOPHILS NFR BLD AUTO: 0 % (ref 0–1.5)
BASOPHILS NFR BLD AUTO: 0 % (ref 0–1.5)
BASOPHILS NFR BLD AUTO: 0.1 % (ref 0–1.5)
BASOPHILS NFR BLD AUTO: 0.2 % (ref 0–1.5)
BASOPHILS NFR BLD AUTO: 0.3 % (ref 0–1.5)
BDY SITE: ABNORMAL
BILIRUB SERPL-MCNC: 0.2 MG/DL (ref 0–1.2)
BILIRUB SERPL-MCNC: 0.3 MG/DL (ref 0–1.2)
BILIRUB SERPL-MCNC: 0.4 MG/DL (ref 0–1.2)
BILIRUB SERPL-MCNC: 0.4 MG/DL (ref 0–1.2)
BILIRUB SERPL-MCNC: 0.5 MG/DL (ref 0–1.2)
BILIRUB SERPL-MCNC: 0.6 MG/DL (ref 0–1.2)
BILIRUB UR QL STRIP: NEGATIVE
BILIRUB UR QL STRIP: NEGATIVE
BUN SERPL-MCNC: 28 MG/DL (ref 8–23)
BUN SERPL-MCNC: 31 MG/DL (ref 8–23)
BUN SERPL-MCNC: 36 MG/DL (ref 8–23)
BUN SERPL-MCNC: 36 MG/DL (ref 8–23)
BUN SERPL-MCNC: 38 MG/DL (ref 8–23)
BUN SERPL-MCNC: 39 MG/DL (ref 8–23)
BUN SERPL-MCNC: 39 MG/DL (ref 8–23)
BUN SERPL-MCNC: 40 MG/DL (ref 8–23)
BUN SERPL-MCNC: 40 MG/DL (ref 8–23)
BUN SERPL-MCNC: 41 MG/DL (ref 8–23)
BUN/CREAT SERPL: 16.4 (ref 7–25)
BUN/CREAT SERPL: 20.7 (ref 7–25)
BUN/CREAT SERPL: 22.9 (ref 7–25)
BUN/CREAT SERPL: 24.4 (ref 7–25)
BUN/CREAT SERPL: 24.7 (ref 7–25)
BUN/CREAT SERPL: 25.5 (ref 7–25)
BUN/CREAT SERPL: 26.5 (ref 7–25)
BUN/CREAT SERPL: 28.1 (ref 7–25)
BUN/CREAT SERPL: 28.7 (ref 7–25)
BUN/CREAT SERPL: 29.9 (ref 7–25)
CALCIUM SPEC-SCNC: 8.1 MG/DL (ref 8.6–10.5)
CALCIUM SPEC-SCNC: 8.4 MG/DL (ref 8.6–10.5)
CALCIUM SPEC-SCNC: 8.6 MG/DL (ref 8.6–10.5)
CALCIUM SPEC-SCNC: 8.7 MG/DL (ref 8.6–10.5)
CALCIUM SPEC-SCNC: 8.7 MG/DL (ref 8.6–10.5)
CALCIUM SPEC-SCNC: 8.9 MG/DL (ref 8.6–10.5)
CALCIUM SPEC-SCNC: 8.9 MG/DL (ref 8.6–10.5)
CHLORIDE SERPL-SCNC: 102 MMOL/L (ref 98–107)
CHLORIDE SERPL-SCNC: 103 MMOL/L (ref 98–107)
CHLORIDE SERPL-SCNC: 104 MMOL/L (ref 98–107)
CHLORIDE SERPL-SCNC: 105 MMOL/L (ref 98–107)
CHLORIDE SERPL-SCNC: 106 MMOL/L (ref 98–107)
CHLORIDE SERPL-SCNC: 106 MMOL/L (ref 98–107)
CLARITY UR: CLEAR
CLARITY UR: CLEAR
CO2 SERPL-SCNC: 18 MMOL/L (ref 22–29)
CO2 SERPL-SCNC: 21 MMOL/L (ref 22–29)
CO2 SERPL-SCNC: 22 MMOL/L (ref 22–29)
CO2 SERPL-SCNC: 24 MMOL/L (ref 22–29)
CO2 SERPL-SCNC: 24 MMOL/L (ref 22–29)
CO2 SERPL-SCNC: 25 MMOL/L (ref 22–29)
COLOR UR: YELLOW
COLOR UR: YELLOW
CPAP: 10 CMH2O
CPAP: 13 CMH2O
CREAT SERPL-MCNC: 1.34 MG/DL (ref 0.76–1.27)
CREAT SERPL-MCNC: 1.35 MG/DL (ref 0.76–1.27)
CREAT SERPL-MCNC: 1.36 MG/DL (ref 0.76–1.27)
CREAT SERPL-MCNC: 1.36 MG/DL (ref 0.76–1.27)
CREAT SERPL-MCNC: 1.5 MG/DL (ref 0.76–1.27)
CREAT SERPL-MCNC: 1.53 MG/DL (ref 0.76–1.27)
CREAT SERPL-MCNC: 1.57 MG/DL (ref 0.76–1.27)
CREAT SERPL-MCNC: 1.64 MG/DL (ref 0.76–1.27)
CREAT SERPL-MCNC: 1.66 MG/DL (ref 0.76–1.27)
CREAT SERPL-MCNC: 1.71 MG/DL (ref 0.76–1.27)
CRP SERPL-MCNC: 11.1 MG/DL (ref 0–0.5)
CRP SERPL-MCNC: 15.21 MG/DL (ref 0–0.5)
CRP SERPL-MCNC: 2.28 MG/DL (ref 0–0.5)
CRP SERPL-MCNC: 2.51 MG/DL (ref 0–0.5)
CRP SERPL-MCNC: 4.63 MG/DL (ref 0–0.5)
CRP SERPL-MCNC: 4.74 MG/DL (ref 0–0.5)
CRP SERPL-MCNC: 8.6 MG/DL (ref 0–0.5)
D-DIMER, QUANTITATIVE (MAD,POW, STR): 970 NG/ML (FEU) (ref 0–740)
D-LACTATE SERPL-SCNC: 0.9 MMOL/L (ref 0.5–2)
D-LACTATE SERPL-SCNC: 1.5 MMOL/L (ref 0.5–2)
DEPRECATED RDW RBC AUTO: 41.9 FL (ref 37–54)
DEPRECATED RDW RBC AUTO: 42.2 FL (ref 37–54)
DEPRECATED RDW RBC AUTO: 42.5 FL (ref 37–54)
DEPRECATED RDW RBC AUTO: 42.7 FL (ref 37–54)
DEPRECATED RDW RBC AUTO: 43.6 FL (ref 37–54)
DEPRECATED RDW RBC AUTO: 43.8 FL (ref 37–54)
EGFRCR SERPLBLD CKD-EPI 2021: 41.5 ML/MIN/1.73
EGFRCR SERPLBLD CKD-EPI 2021: 43 ML/MIN/1.73
EGFRCR SERPLBLD CKD-EPI 2021: 43.6 ML/MIN/1.73
EGFRCR SERPLBLD CKD-EPI 2021: 46 ML/MIN/1.73
EGFRCR SERPLBLD CKD-EPI 2021: 47.4 ML/MIN/1.73
EGFRCR SERPLBLD CKD-EPI 2021: 48.6 ML/MIN/1.73
EGFRCR SERPLBLD CKD-EPI 2021: 54.6 ML/MIN/1.73
EGFRCR SERPLBLD CKD-EPI 2021: 54.6 ML/MIN/1.73
EGFRCR SERPLBLD CKD-EPI 2021: 55.1 ML/MIN/1.73
EGFRCR SERPLBLD CKD-EPI 2021: 55.6 ML/MIN/1.73
EOSINOPHIL # BLD AUTO: 0 10*3/MM3 (ref 0–0.4)
EOSINOPHIL # BLD AUTO: 0.01 10*3/MM3 (ref 0–0.4)
EOSINOPHIL NFR BLD AUTO: 0 % (ref 0.3–6.2)
EOSINOPHIL NFR BLD AUTO: 0.1 % (ref 0.3–6.2)
ERYTHROCYTE [DISTWIDTH] IN BLOOD BY AUTOMATED COUNT: 13.1 % (ref 12.3–15.4)
ERYTHROCYTE [DISTWIDTH] IN BLOOD BY AUTOMATED COUNT: 13.2 % (ref 12.3–15.4)
ERYTHROCYTE [DISTWIDTH] IN BLOOD BY AUTOMATED COUNT: 13.3 % (ref 12.3–15.4)
ERYTHROCYTE [DISTWIDTH] IN BLOOD BY AUTOMATED COUNT: 13.3 % (ref 12.3–15.4)
ERYTHROCYTE [DISTWIDTH] IN BLOOD BY AUTOMATED COUNT: 13.6 % (ref 12.3–15.4)
GAS FLOW AIRWAY: 15 LPM
GLOBULIN UR ELPH-MCNC: 2.9 GM/DL
GLOBULIN UR ELPH-MCNC: 2.9 GM/DL
GLOBULIN UR ELPH-MCNC: 3 GM/DL
GLOBULIN UR ELPH-MCNC: 3 GM/DL
GLOBULIN UR ELPH-MCNC: 3.2 GM/DL
GLOBULIN UR ELPH-MCNC: 3.3 GM/DL
GLUCOSE SERPL-MCNC: 104 MG/DL (ref 65–99)
GLUCOSE SERPL-MCNC: 110 MG/DL (ref 65–99)
GLUCOSE SERPL-MCNC: 111 MG/DL (ref 65–99)
GLUCOSE SERPL-MCNC: 112 MG/DL (ref 65–99)
GLUCOSE SERPL-MCNC: 114 MG/DL (ref 65–99)
GLUCOSE SERPL-MCNC: 114 MG/DL (ref 65–99)
GLUCOSE SERPL-MCNC: 121 MG/DL (ref 65–99)
GLUCOSE SERPL-MCNC: 125 MG/DL (ref 65–99)
GLUCOSE SERPL-MCNC: 129 MG/DL (ref 65–99)
GLUCOSE SERPL-MCNC: 166 MG/DL (ref 65–99)
GLUCOSE UR STRIP-MCNC: NEGATIVE MG/DL
GLUCOSE UR STRIP-MCNC: NEGATIVE MG/DL
HCO3 BLDA-SCNC: 20.1 MMOL/L (ref 20–26)
HCO3 BLDA-SCNC: 21 MMOL/L (ref 20–26)
HCO3 BLDA-SCNC: 21 MMOL/L (ref 20–26)
HCT VFR BLD AUTO: 37.3 % (ref 37.5–51)
HCT VFR BLD AUTO: 38.4 % (ref 37.5–51)
HCT VFR BLD AUTO: 38.7 % (ref 37.5–51)
HCT VFR BLD AUTO: 38.8 % (ref 37.5–51)
HCT VFR BLD AUTO: 39 % (ref 37.5–51)
HCT VFR BLD AUTO: 39.8 % (ref 37.5–51)
HCT VFR BLD AUTO: 40.1 % (ref 37.5–51)
HCT VFR BLD AUTO: 40.3 % (ref 37.5–51)
HCT VFR BLD AUTO: 41.2 % (ref 37.5–51)
HCT VFR BLD AUTO: 42.2 % (ref 37.5–51)
HGB BLD-MCNC: 12.9 G/DL (ref 13–17.7)
HGB BLD-MCNC: 12.9 G/DL (ref 13–17.7)
HGB BLD-MCNC: 13.1 G/DL (ref 13–17.7)
HGB BLD-MCNC: 13.1 G/DL (ref 13–17.7)
HGB BLD-MCNC: 13.2 G/DL (ref 13–17.7)
HGB BLD-MCNC: 13.3 G/DL (ref 13–17.7)
HGB BLD-MCNC: 13.7 G/DL (ref 13–17.7)
HGB BLD-MCNC: 14 G/DL (ref 13–17.7)
HGB BLD-MCNC: 14.1 G/DL (ref 13–17.7)
HGB BLD-MCNC: 14.1 G/DL (ref 13–17.7)
HGB UR QL STRIP.AUTO: ABNORMAL
HGB UR QL STRIP.AUTO: NEGATIVE
HOLD SPECIMEN: NORMAL
HYALINE CASTS UR QL AUTO: ABNORMAL /LPF
IMM GRANULOCYTES # BLD AUTO: 0.01 10*3/MM3 (ref 0–0.05)
IMM GRANULOCYTES # BLD AUTO: 0.02 10*3/MM3 (ref 0–0.05)
IMM GRANULOCYTES # BLD AUTO: 0.04 10*3/MM3 (ref 0–0.05)
IMM GRANULOCYTES # BLD AUTO: 0.05 10*3/MM3 (ref 0–0.05)
IMM GRANULOCYTES # BLD AUTO: 0.07 10*3/MM3 (ref 0–0.05)
IMM GRANULOCYTES # BLD AUTO: 0.1 10*3/MM3 (ref 0–0.05)
IMM GRANULOCYTES # BLD AUTO: 0.12 10*3/MM3 (ref 0–0.05)
IMM GRANULOCYTES # BLD AUTO: 0.3 10*3/MM3 (ref 0–0.05)
IMM GRANULOCYTES NFR BLD AUTO: 0.3 % (ref 0–0.5)
IMM GRANULOCYTES NFR BLD AUTO: 0.3 % (ref 0–0.5)
IMM GRANULOCYTES NFR BLD AUTO: 0.4 % (ref 0–0.5)
IMM GRANULOCYTES NFR BLD AUTO: 0.6 % (ref 0–0.5)
IMM GRANULOCYTES NFR BLD AUTO: 0.7 % (ref 0–0.5)
IMM GRANULOCYTES NFR BLD AUTO: 0.8 % (ref 0–0.5)
IMM GRANULOCYTES NFR BLD AUTO: 1.1 % (ref 0–0.5)
IMM GRANULOCYTES NFR BLD AUTO: 1.2 % (ref 0–0.5)
IMM GRANULOCYTES NFR BLD AUTO: 1.3 % (ref 0–0.5)
IMM GRANULOCYTES NFR BLD AUTO: 2.1 % (ref 0–0.5)
INHALED O2 CONCENTRATION: 50 %
INHALED O2 CONCENTRATION: 65 %
KETONES UR QL STRIP: ABNORMAL
KETONES UR QL STRIP: NEGATIVE
LDH SERPL-CCNC: 208 U/L (ref 135–225)
LDH SERPL-CCNC: 219 U/L (ref 135–225)
LDH SERPL-CCNC: 293 U/L (ref 135–225)
LEUKOCYTE ESTERASE UR QL STRIP.AUTO: NEGATIVE
LEUKOCYTE ESTERASE UR QL STRIP.AUTO: NEGATIVE
LYMPHOCYTES # BLD AUTO: 0.39 10*3/MM3 (ref 0.7–3.1)
LYMPHOCYTES # BLD AUTO: 0.41 10*3/MM3 (ref 0.7–3.1)
LYMPHOCYTES # BLD AUTO: 0.44 10*3/MM3 (ref 0.7–3.1)
LYMPHOCYTES # BLD AUTO: 0.44 10*3/MM3 (ref 0.7–3.1)
LYMPHOCYTES # BLD AUTO: 0.61 10*3/MM3 (ref 0.7–3.1)
LYMPHOCYTES # BLD AUTO: 0.63 10*3/MM3 (ref 0.7–3.1)
LYMPHOCYTES # BLD AUTO: 0.66 10*3/MM3 (ref 0.7–3.1)
LYMPHOCYTES # BLD AUTO: 0.67 10*3/MM3 (ref 0.7–3.1)
LYMPHOCYTES # BLD AUTO: 0.67 10*3/MM3 (ref 0.7–3.1)
LYMPHOCYTES # BLD AUTO: 0.86 10*3/MM3 (ref 0.7–3.1)
LYMPHOCYTES NFR BLD AUTO: 12.7 % (ref 19.6–45.3)
LYMPHOCYTES NFR BLD AUTO: 14.8 % (ref 19.6–45.3)
LYMPHOCYTES NFR BLD AUTO: 3.6 % (ref 19.6–45.3)
LYMPHOCYTES NFR BLD AUTO: 4.3 % (ref 19.6–45.3)
LYMPHOCYTES NFR BLD AUTO: 4.4 % (ref 19.6–45.3)
LYMPHOCYTES NFR BLD AUTO: 4.6 % (ref 19.6–45.3)
LYMPHOCYTES NFR BLD AUTO: 6.1 % (ref 19.6–45.3)
LYMPHOCYTES NFR BLD AUTO: 6.3 % (ref 19.6–45.3)
LYMPHOCYTES NFR BLD AUTO: 6.7 % (ref 19.6–45.3)
LYMPHOCYTES NFR BLD AUTO: 6.9 % (ref 19.6–45.3)
Lab: ABNORMAL
MAGNESIUM SERPL-MCNC: 2.2 MG/DL (ref 1.6–2.4)
MCH RBC QN AUTO: 29.5 PG (ref 26.6–33)
MCH RBC QN AUTO: 29.6 PG (ref 26.6–33)
MCH RBC QN AUTO: 29.7 PG (ref 26.6–33)
MCH RBC QN AUTO: 29.7 PG (ref 26.6–33)
MCH RBC QN AUTO: 29.8 PG (ref 26.6–33)
MCH RBC QN AUTO: 29.8 PG (ref 26.6–33)
MCH RBC QN AUTO: 30 PG (ref 26.6–33)
MCH RBC QN AUTO: 30.4 PG (ref 26.6–33)
MCH RBC QN AUTO: 30.5 PG (ref 26.6–33)
MCH RBC QN AUTO: 30.7 PG (ref 26.6–33)
MCHC RBC AUTO-ENTMCNC: 33.2 G/DL (ref 31.5–35.7)
MCHC RBC AUTO-ENTMCNC: 33.4 G/DL (ref 31.5–35.7)
MCHC RBC AUTO-ENTMCNC: 33.4 G/DL (ref 31.5–35.7)
MCHC RBC AUTO-ENTMCNC: 33.6 G/DL (ref 31.5–35.7)
MCHC RBC AUTO-ENTMCNC: 34 G/DL (ref 31.5–35.7)
MCHC RBC AUTO-ENTMCNC: 34.1 G/DL (ref 31.5–35.7)
MCHC RBC AUTO-ENTMCNC: 34.1 G/DL (ref 31.5–35.7)
MCHC RBC AUTO-ENTMCNC: 34.2 G/DL (ref 31.5–35.7)
MCHC RBC AUTO-ENTMCNC: 34.6 G/DL (ref 31.5–35.7)
MCHC RBC AUTO-ENTMCNC: 34.9 G/DL (ref 31.5–35.7)
MCV RBC AUTO: 87.1 FL (ref 79–97)
MCV RBC AUTO: 87.2 FL (ref 79–97)
MCV RBC AUTO: 87.8 FL (ref 79–97)
MCV RBC AUTO: 87.9 FL (ref 79–97)
MCV RBC AUTO: 88 FL (ref 79–97)
MCV RBC AUTO: 88 FL (ref 79–97)
MCV RBC AUTO: 88.3 FL (ref 79–97)
MCV RBC AUTO: 89 FL (ref 79–97)
MCV RBC AUTO: 89.4 FL (ref 79–97)
MCV RBC AUTO: 89.5 FL (ref 79–97)
MODALITY: ABNORMAL
MONOCYTES # BLD AUTO: 0.26 10*3/MM3 (ref 0.1–0.9)
MONOCYTES # BLD AUTO: 0.3 10*3/MM3 (ref 0.1–0.9)
MONOCYTES # BLD AUTO: 0.3 10*3/MM3 (ref 0.1–0.9)
MONOCYTES # BLD AUTO: 0.37 10*3/MM3 (ref 0.1–0.9)
MONOCYTES # BLD AUTO: 0.41 10*3/MM3 (ref 0.1–0.9)
MONOCYTES # BLD AUTO: 0.42 10*3/MM3 (ref 0.1–0.9)
MONOCYTES # BLD AUTO: 0.45 10*3/MM3 (ref 0.1–0.9)
MONOCYTES # BLD AUTO: 0.46 10*3/MM3 (ref 0.1–0.9)
MONOCYTES # BLD AUTO: 0.49 10*3/MM3 (ref 0.1–0.9)
MONOCYTES # BLD AUTO: 0.52 10*3/MM3 (ref 0.1–0.9)
MONOCYTES NFR BLD AUTO: 2.5 % (ref 5–12)
MONOCYTES NFR BLD AUTO: 3.1 % (ref 5–12)
MONOCYTES NFR BLD AUTO: 3.2 % (ref 5–12)
MONOCYTES NFR BLD AUTO: 3.5 % (ref 5–12)
MONOCYTES NFR BLD AUTO: 4 % (ref 5–12)
MONOCYTES NFR BLD AUTO: 4.3 % (ref 5–12)
MONOCYTES NFR BLD AUTO: 5.1 % (ref 5–12)
MONOCYTES NFR BLD AUTO: 5.1 % (ref 5–12)
MONOCYTES NFR BLD AUTO: 8.4 % (ref 5–12)
MONOCYTES NFR BLD AUTO: 8.4 % (ref 5–12)
MRSA DNA SPEC QL NAA+PROBE: NEGATIVE
NEUTROPHILS NFR BLD AUTO: 11.21 10*3/MM3 (ref 1.7–7)
NEUTROPHILS NFR BLD AUTO: 12.92 10*3/MM3 (ref 1.7–7)
NEUTROPHILS NFR BLD AUTO: 2.42 10*3/MM3 (ref 1.7–7)
NEUTROPHILS NFR BLD AUTO: 4.45 10*3/MM3 (ref 1.7–7)
NEUTROPHILS NFR BLD AUTO: 7.95 10*3/MM3 (ref 1.7–7)
NEUTROPHILS NFR BLD AUTO: 76.3 % (ref 42.7–76)
NEUTROPHILS NFR BLD AUTO: 78.6 % (ref 42.7–76)
NEUTROPHILS NFR BLD AUTO: 8.48 10*3/MM3 (ref 1.7–7)
NEUTROPHILS NFR BLD AUTO: 8.55 10*3/MM3 (ref 1.7–7)
NEUTROPHILS NFR BLD AUTO: 87.4 % (ref 42.7–76)
NEUTROPHILS NFR BLD AUTO: 87.5 % (ref 42.7–76)
NEUTROPHILS NFR BLD AUTO: 88.3 % (ref 42.7–76)
NEUTROPHILS NFR BLD AUTO: 88.9 % (ref 42.7–76)
NEUTROPHILS NFR BLD AUTO: 89.8 % (ref 42.7–76)
NEUTROPHILS NFR BLD AUTO: 9.08 10*3/MM3 (ref 1.7–7)
NEUTROPHILS NFR BLD AUTO: 9.4 10*3/MM3 (ref 1.7–7)
NEUTROPHILS NFR BLD AUTO: 9.51 10*3/MM3 (ref 1.7–7)
NEUTROPHILS NFR BLD AUTO: 91 % (ref 42.7–76)
NEUTROPHILS NFR BLD AUTO: 91 % (ref 42.7–76)
NEUTROPHILS NFR BLD AUTO: 93 % (ref 42.7–76)
NITRITE UR QL STRIP: NEGATIVE
NITRITE UR QL STRIP: NEGATIVE
NRBC BLD AUTO-RTO: 0 /100 WBC (ref 0–0.2)
NT-PROBNP SERPL-MCNC: 163.9 PG/ML (ref 0–900)
PAW @ PEAK INSP FLOW SETTING VENT: 13 CMH2O
PCO2 BLDA: 29 MM HG (ref 35–45)
PCO2 BLDA: 30.5 MM HG (ref 35–45)
PCO2 BLDA: 33.6 MM HG (ref 35–45)
PH BLDA: 7.4 PH UNITS (ref 7.35–7.45)
PH BLDA: 7.45 PH UNITS (ref 7.35–7.45)
PH BLDA: 7.45 PH UNITS (ref 7.35–7.45)
PH UR STRIP.AUTO: <=5 [PH] (ref 5–9)
PH UR STRIP.AUTO: <=5 [PH] (ref 5–9)
PLATELET # BLD AUTO: 183 10*3/MM3 (ref 140–450)
PLATELET # BLD AUTO: 189 10*3/MM3 (ref 140–450)
PLATELET # BLD AUTO: 196 10*3/MM3 (ref 140–450)
PLATELET # BLD AUTO: 197 10*3/MM3 (ref 140–450)
PLATELET # BLD AUTO: 210 10*3/MM3 (ref 140–450)
PLATELET # BLD AUTO: 224 10*3/MM3 (ref 140–450)
PLATELET # BLD AUTO: 235 10*3/MM3 (ref 140–450)
PLATELET # BLD AUTO: 238 10*3/MM3 (ref 140–450)
PLATELET # BLD AUTO: 242 10*3/MM3 (ref 140–450)
PLATELET # BLD AUTO: 253 10*3/MM3 (ref 140–450)
PMV BLD AUTO: 8.6 FL (ref 6–12)
PMV BLD AUTO: 8.7 FL (ref 6–12)
PMV BLD AUTO: 8.9 FL (ref 6–12)
PMV BLD AUTO: 9.1 FL (ref 6–12)
PMV BLD AUTO: 9.4 FL (ref 6–12)
PO2 BLDA: 51.6 MM HG (ref 83–108)
PO2 BLDA: 52.2 MM HG (ref 83–108)
PO2 BLDA: 74.7 MM HG (ref 83–108)
POTASSIUM SERPL-SCNC: 3.7 MMOL/L (ref 3.5–5.2)
POTASSIUM SERPL-SCNC: 4.2 MMOL/L (ref 3.5–5.2)
POTASSIUM SERPL-SCNC: 4.3 MMOL/L (ref 3.5–5.2)
POTASSIUM SERPL-SCNC: 4.5 MMOL/L (ref 3.5–5.2)
POTASSIUM SERPL-SCNC: 4.5 MMOL/L (ref 3.5–5.2)
POTASSIUM SERPL-SCNC: 4.6 MMOL/L (ref 3.5–5.2)
POTASSIUM SERPL-SCNC: 4.7 MMOL/L (ref 3.5–5.2)
POTASSIUM SERPL-SCNC: 4.7 MMOL/L (ref 3.5–5.2)
POTASSIUM SERPL-SCNC: 4.8 MMOL/L (ref 3.5–5.2)
POTASSIUM SERPL-SCNC: 5.1 MMOL/L (ref 3.5–5.2)
PROCALCITONIN SERPL-MCNC: 0.15 NG/ML (ref 0–0.25)
PROCALCITONIN SERPL-MCNC: 0.15 NG/ML (ref 0–0.25)
PROCALCITONIN SERPL-MCNC: 0.21 NG/ML (ref 0–0.25)
PROCALCITONIN SERPL-MCNC: 0.27 NG/ML (ref 0–0.25)
PROCALCITONIN SERPL-MCNC: 0.3 NG/ML (ref 0–0.25)
PROCALCITONIN SERPL-MCNC: 0.44 NG/ML (ref 0–0.25)
PROT SERPL-MCNC: 6.2 G/DL (ref 6–8.5)
PROT SERPL-MCNC: 6.4 G/DL (ref 6–8.5)
PROT SERPL-MCNC: 6.5 G/DL (ref 6–8.5)
PROT SERPL-MCNC: 6.6 G/DL (ref 6–8.5)
PROT UR QL STRIP: ABNORMAL
PROT UR QL STRIP: NEGATIVE
QT INTERVAL: 340 MS
QTC INTERVAL: 397 MS
RBC # BLD AUTO: 4.25 10*6/MM3 (ref 4.14–5.8)
RBC # BLD AUTO: 4.29 10*6/MM3 (ref 4.14–5.8)
RBC # BLD AUTO: 4.34 10*6/MM3 (ref 4.14–5.8)
RBC # BLD AUTO: 4.4 10*6/MM3 (ref 4.14–5.8)
RBC # BLD AUTO: 4.43 10*6/MM3 (ref 4.14–5.8)
RBC # BLD AUTO: 4.47 10*6/MM3 (ref 4.14–5.8)
RBC # BLD AUTO: 4.56 10*6/MM3 (ref 4.14–5.8)
RBC # BLD AUTO: 4.62 10*6/MM3 (ref 4.14–5.8)
RBC # BLD AUTO: 4.73 10*6/MM3 (ref 4.14–5.8)
RBC # BLD AUTO: 4.78 10*6/MM3 (ref 4.14–5.8)
RBC # UR STRIP: ABNORMAL /HPF
REF LAB TEST METHOD: ABNORMAL
SAO2 % BLDCOA: 87.7 % (ref 94–99)
SAO2 % BLDCOA: 89.2 % (ref 94–99)
SAO2 % BLDCOA: 95.6 % (ref 94–99)
SODIUM SERPL-SCNC: 134 MMOL/L (ref 136–145)
SODIUM SERPL-SCNC: 135 MMOL/L (ref 136–145)
SODIUM SERPL-SCNC: 136 MMOL/L (ref 136–145)
SODIUM SERPL-SCNC: 136 MMOL/L (ref 136–145)
SODIUM SERPL-SCNC: 138 MMOL/L (ref 136–145)
SODIUM SERPL-SCNC: 138 MMOL/L (ref 136–145)
SP GR UR STRIP: 1.02 (ref 1–1.03)
SP GR UR STRIP: 1.02 (ref 1–1.03)
SQUAMOUS #/AREA URNS HPF: ABNORMAL /HPF
TROPONIN T SERPL HS-MCNC: 12 NG/L
UROBILINOGEN UR QL STRIP: ABNORMAL
UROBILINOGEN UR QL STRIP: ABNORMAL
VENTILATOR MODE: ABNORMAL
VT ON VENT VENT: 1096 ML
WBC # UR STRIP: ABNORMAL /HPF
WBC NRBC COR # BLD: 10.28 10*3/MM3 (ref 3.4–10.8)
WBC NRBC COR # BLD: 10.32 10*3/MM3 (ref 3.4–10.8)
WBC NRBC COR # BLD: 10.69 10*3/MM3 (ref 3.4–10.8)
WBC NRBC COR # BLD: 12.06 10*3/MM3 (ref 3.4–10.8)
WBC NRBC COR # BLD: 14.38 10*3/MM3 (ref 3.4–10.8)
WBC NRBC COR # BLD: 3.08 10*3/MM3 (ref 3.4–10.8)
WBC NRBC COR # BLD: 5.83 10*3/MM3 (ref 3.4–10.8)
WBC NRBC COR # BLD: 9.08 10*3/MM3 (ref 3.4–10.8)
WBC NRBC COR # BLD: 9.39 10*3/MM3 (ref 3.4–10.8)
WBC NRBC COR # BLD: 9.71 10*3/MM3 (ref 3.4–10.8)
WHOLE BLOOD HOLD COAG: NORMAL
WHOLE BLOOD HOLD SPECIMEN: NORMAL

## 2023-01-01 PROCEDURE — 85025 COMPLETE CBC W/AUTO DIFF WBC: CPT | Performed by: NURSE PRACTITIONER

## 2023-01-01 PROCEDURE — 25010000002 HYDROMORPHONE 1 MG/ML SOLUTION

## 2023-01-01 PROCEDURE — 86140 C-REACTIVE PROTEIN: CPT

## 2023-01-01 PROCEDURE — 71045 X-RAY EXAM CHEST 1 VIEW: CPT

## 2023-01-01 PROCEDURE — 85025 COMPLETE CBC W/AUTO DIFF WBC: CPT

## 2023-01-01 PROCEDURE — 25010000002 AZITHROMYCIN PER 500 MG: Performed by: NURSE PRACTITIONER

## 2023-01-01 PROCEDURE — G0378 HOSPITAL OBSERVATION PER HR: HCPCS

## 2023-01-01 PROCEDURE — 25010000002 CEFTRIAXONE PER 250 MG: Performed by: NURSE PRACTITIONER

## 2023-01-01 PROCEDURE — 83735 ASSAY OF MAGNESIUM: CPT | Performed by: INTERNAL MEDICINE

## 2023-01-01 PROCEDURE — 25010000002 MORPHINE PER 10 MG: Performed by: INTERNAL MEDICINE

## 2023-01-01 PROCEDURE — 25010000002 CEFEPIME PER 500 MG: Performed by: INTERNAL MEDICINE

## 2023-01-01 PROCEDURE — 25010000002 LORAZEPAM PER 2 MG

## 2023-01-01 PROCEDURE — 94761 N-INVAS EAR/PLS OXIMETRY MLT: CPT

## 2023-01-01 PROCEDURE — 80048 BASIC METABOLIC PNL TOTAL CA: CPT

## 2023-01-01 PROCEDURE — 94760 N-INVAS EAR/PLS OXIMETRY 1: CPT

## 2023-01-01 PROCEDURE — 94799 UNLISTED PULMONARY SVC/PX: CPT

## 2023-01-01 PROCEDURE — 94664 DEMO&/EVAL PT USE INHALER: CPT

## 2023-01-01 PROCEDURE — 25010000002 LORAZEPAM PER 2 MG: Performed by: INTERNAL MEDICINE

## 2023-01-01 PROCEDURE — 94660 CPAP INITIATION&MGMT: CPT

## 2023-01-01 PROCEDURE — 87040 BLOOD CULTURE FOR BACTERIA: CPT | Performed by: NURSE PRACTITIONER

## 2023-01-01 PROCEDURE — 82803 BLOOD GASES ANY COMBINATION: CPT

## 2023-01-01 PROCEDURE — 80053 COMPREHEN METABOLIC PANEL: CPT | Performed by: NURSE PRACTITIONER

## 2023-01-01 PROCEDURE — 94640 AIRWAY INHALATION TREATMENT: CPT

## 2023-01-01 PROCEDURE — 84145 PROCALCITONIN (PCT): CPT | Performed by: NURSE PRACTITIONER

## 2023-01-01 PROCEDURE — 25010000002 VANCOMYCIN 10 G RECONSTITUTED SOLUTION: Performed by: INTERNAL MEDICINE

## 2023-01-01 PROCEDURE — 83615 LACTATE (LD) (LDH) ENZYME: CPT | Performed by: NURSE PRACTITIONER

## 2023-01-01 PROCEDURE — 71250 CT THORAX DX C-: CPT

## 2023-01-01 PROCEDURE — 86140 C-REACTIVE PROTEIN: CPT | Performed by: NURSE PRACTITIONER

## 2023-01-01 PROCEDURE — 25010000002 ONDANSETRON PER 1 MG: Performed by: NURSE PRACTITIONER

## 2023-01-01 PROCEDURE — 81003 URINALYSIS AUTO W/O SCOPE: CPT | Performed by: NURSE PRACTITIONER

## 2023-01-01 PROCEDURE — 87641 MR-STAPH DNA AMP PROBE: CPT | Performed by: INTERNAL MEDICINE

## 2023-01-01 PROCEDURE — 93010 ELECTROCARDIOGRAM REPORT: CPT | Performed by: INTERNAL MEDICINE

## 2023-01-01 PROCEDURE — 83605 ASSAY OF LACTIC ACID: CPT | Performed by: NURSE PRACTITIONER

## 2023-01-01 PROCEDURE — 80053 COMPREHEN METABOLIC PANEL: CPT

## 2023-01-01 PROCEDURE — 63710000001 DEXAMETHASONE PER 0.25 MG: Performed by: NURSE PRACTITIONER

## 2023-01-01 PROCEDURE — 84145 PROCALCITONIN (PCT): CPT

## 2023-01-01 PROCEDURE — 25010000002 METHYLPREDNISOLONE PER 40 MG: Performed by: INTERNAL MEDICINE

## 2023-01-01 PROCEDURE — 93005 ELECTROCARDIOGRAM TRACING: CPT

## 2023-01-01 PROCEDURE — 25010000002 DEXAMETHASONE PER 1 MG: Performed by: NURSE PRACTITIONER

## 2023-01-01 PROCEDURE — 84484 ASSAY OF TROPONIN QUANT: CPT

## 2023-01-01 PROCEDURE — 85379 FIBRIN DEGRADATION QUANT: CPT | Performed by: NURSE PRACTITIONER

## 2023-01-01 PROCEDURE — 81001 URINALYSIS AUTO W/SCOPE: CPT | Performed by: INTERNAL MEDICINE

## 2023-01-01 PROCEDURE — 83880 ASSAY OF NATRIURETIC PEPTIDE: CPT

## 2023-01-01 PROCEDURE — 36600 WITHDRAWAL OF ARTERIAL BLOOD: CPT

## 2023-01-01 PROCEDURE — 25010000002 FUROSEMIDE PER 20 MG: Performed by: NURSE PRACTITIONER

## 2023-01-01 PROCEDURE — 93005 ELECTROCARDIOGRAM TRACING: CPT | Performed by: STUDENT IN AN ORGANIZED HEALTH CARE EDUCATION/TRAINING PROGRAM

## 2023-01-01 PROCEDURE — 25010000002 FUROSEMIDE PER 20 MG: Performed by: INTERNAL MEDICINE

## 2023-01-01 PROCEDURE — 99285 EMERGENCY DEPT VISIT HI MDM: CPT

## 2023-01-01 PROCEDURE — 99291 CRITICAL CARE FIRST HOUR: CPT | Performed by: INTERNAL MEDICINE

## 2023-01-01 PROCEDURE — 5A09457 ASSISTANCE WITH RESPIRATORY VENTILATION, 24-96 CONSECUTIVE HOURS, CONTINUOUS POSITIVE AIRWAY PRESSURE: ICD-10-PCS | Performed by: HOSPITALIST

## 2023-01-01 PROCEDURE — 97162 PT EVAL MOD COMPLEX 30 MIN: CPT

## 2023-01-01 PROCEDURE — 99233 SBSQ HOSP IP/OBS HIGH 50: CPT | Performed by: INTERNAL MEDICINE

## 2023-01-01 PROCEDURE — 80053 COMPREHEN METABOLIC PANEL: CPT | Performed by: INTERNAL MEDICINE

## 2023-01-01 PROCEDURE — 85025 COMPLETE CBC W/AUTO DIFF WBC: CPT | Performed by: INTERNAL MEDICINE

## 2023-01-01 PROCEDURE — 84145 PROCALCITONIN (PCT): CPT | Performed by: INTERNAL MEDICINE

## 2023-01-01 PROCEDURE — 36415 COLL VENOUS BLD VENIPUNCTURE: CPT | Performed by: NURSE PRACTITIONER

## 2023-01-01 RX ORDER — CHOLECALCIFEROL (VITAMIN D3) 125 MCG
5 CAPSULE ORAL NIGHTLY PRN
Start: 2023-01-01

## 2023-01-01 RX ORDER — IBUPROFEN 600 MG/1
600 TABLET ORAL EVERY 6 HOURS PRN
Status: DISCONTINUED | OUTPATIENT
Start: 2023-01-01 | End: 2023-01-01

## 2023-01-01 RX ORDER — DILTIAZEM HYDROCHLORIDE 120 MG/1
120 CAPSULE, COATED, EXTENDED RELEASE ORAL
Status: DISCONTINUED | OUTPATIENT
Start: 2023-01-01 | End: 2023-01-01 | Stop reason: HOSPADM

## 2023-01-01 RX ORDER — SODIUM CHLORIDE 0.9 % (FLUSH) 0.9 %
3 SYRINGE (ML) INJECTION AS NEEDED
Status: DISCONTINUED | OUTPATIENT
Start: 2023-01-01 | End: 2023-01-01

## 2023-01-01 RX ORDER — SODIUM CHLORIDE 0.9 % (FLUSH) 0.9 %
3 SYRINGE (ML) INJECTION EVERY 12 HOURS
Status: DISCONTINUED | OUTPATIENT
Start: 2023-01-01 | End: 2023-01-01

## 2023-01-01 RX ORDER — FUROSEMIDE 10 MG/ML
60 INJECTION INTRAMUSCULAR; INTRAVENOUS ONCE
Status: DISCONTINUED | OUTPATIENT
Start: 2023-01-01 | End: 2023-01-01

## 2023-01-01 RX ORDER — ATORVASTATIN CALCIUM 20 MG/1
20 TABLET, FILM COATED ORAL NIGHTLY
Status: DISCONTINUED | OUTPATIENT
Start: 2023-01-01 | End: 2023-01-01 | Stop reason: HOSPADM

## 2023-01-01 RX ORDER — DEXTROMETHORPHAN POLISTIREX 30 MG/5ML
60 SUSPENSION ORAL EVERY 12 HOURS SCHEDULED
Qty: 280 ML
Start: 2023-01-01

## 2023-01-01 RX ORDER — MIRTAZAPINE 15 MG/1
15 TABLET, FILM COATED ORAL NIGHTLY
Start: 2023-01-01

## 2023-01-01 RX ORDER — BUDESONIDE AND FORMOTEROL FUMARATE DIHYDRATE 160; 4.5 UG/1; UG/1
2 AEROSOL RESPIRATORY (INHALATION)
Status: DISCONTINUED | OUTPATIENT
Start: 2023-01-01 | End: 2023-01-01

## 2023-01-01 RX ORDER — HYDRALAZINE HYDROCHLORIDE 20 MG/ML
10 INJECTION INTRAMUSCULAR; INTRAVENOUS EVERY 4 HOURS PRN
Status: DISCONTINUED | OUTPATIENT
Start: 2023-01-01 | End: 2023-01-01

## 2023-01-01 RX ORDER — DOCUSATE SODIUM 100 MG/1
100 CAPSULE, LIQUID FILLED ORAL 2 TIMES DAILY
Status: DISCONTINUED | OUTPATIENT
Start: 2023-01-01 | End: 2023-01-01

## 2023-01-01 RX ORDER — ATORVASTATIN CALCIUM 20 MG/1
20 TABLET, FILM COATED ORAL NIGHTLY
Status: DISCONTINUED | OUTPATIENT
Start: 2023-01-01 | End: 2023-01-01

## 2023-01-01 RX ORDER — MIRTAZAPINE 15 MG/1
15 TABLET, FILM COATED ORAL NIGHTLY
Status: DISCONTINUED | OUTPATIENT
Start: 2023-01-01 | End: 2023-01-01 | Stop reason: HOSPADM

## 2023-01-01 RX ORDER — ONDANSETRON 4 MG/1
4 TABLET, FILM COATED ORAL EVERY 6 HOURS PRN
Status: DISCONTINUED | OUTPATIENT
Start: 2023-01-01 | End: 2023-01-01 | Stop reason: HOSPADM

## 2023-01-01 RX ORDER — PANTOPRAZOLE SODIUM 40 MG/1
40 TABLET, DELAYED RELEASE ORAL
Status: DISCONTINUED | OUTPATIENT
Start: 2023-01-01 | End: 2023-01-01

## 2023-01-01 RX ORDER — SODIUM CHLORIDE 9 MG/ML
40 INJECTION, SOLUTION INTRAVENOUS AS NEEDED
Status: DISCONTINUED | OUTPATIENT
Start: 2023-01-01 | End: 2023-01-01 | Stop reason: HOSPADM

## 2023-01-01 RX ORDER — MORPHINE SULFATE 2 MG/ML
1 INJECTION, SOLUTION INTRAMUSCULAR; INTRAVENOUS ONCE
Status: DISCONTINUED | OUTPATIENT
Start: 2023-01-01 | End: 2023-01-01

## 2023-01-01 RX ORDER — MORPHINE SULFATE 2 MG/ML
1 INJECTION, SOLUTION INTRAMUSCULAR; INTRAVENOUS
Status: DISCONTINUED | OUTPATIENT
Start: 2023-01-01 | End: 2023-01-01

## 2023-01-01 RX ORDER — LORAZEPAM 2 MG/ML
1 INJECTION INTRAMUSCULAR
Status: DISCONTINUED | OUTPATIENT
Start: 2023-01-01 | End: 2023-01-01 | Stop reason: HOSPADM

## 2023-01-01 RX ORDER — ONDANSETRON 2 MG/ML
4 INJECTION INTRAMUSCULAR; INTRAVENOUS EVERY 6 HOURS PRN
Status: DISCONTINUED | OUTPATIENT
Start: 2023-01-01 | End: 2023-01-01 | Stop reason: HOSPADM

## 2023-01-01 RX ORDER — ACETAMINOPHEN 325 MG/1
650 TABLET ORAL EVERY 6 HOURS PRN
Status: DISCONTINUED | OUTPATIENT
Start: 2023-01-01 | End: 2023-01-01

## 2023-01-01 RX ORDER — LACTULOSE 10 G/15ML
10 SOLUTION ORAL DAILY PRN
Status: DISCONTINUED | OUTPATIENT
Start: 2023-01-01 | End: 2023-01-01

## 2023-01-01 RX ORDER — ALBUTEROL SULFATE 90 UG/1
2 AEROSOL, METERED RESPIRATORY (INHALATION)
Start: 2023-01-01

## 2023-01-01 RX ORDER — SODIUM CHLORIDE 0.9 % (FLUSH) 0.9 %
10 SYRINGE (ML) INJECTION AS NEEDED
Status: DISCONTINUED | OUTPATIENT
Start: 2023-01-01 | End: 2023-01-01 | Stop reason: HOSPADM

## 2023-01-01 RX ORDER — SODIUM CHLORIDE 0.9 % (FLUSH) 0.9 %
10 SYRINGE (ML) INJECTION EVERY 12 HOURS SCHEDULED
Status: DISCONTINUED | OUTPATIENT
Start: 2023-01-01 | End: 2023-01-01 | Stop reason: HOSPADM

## 2023-01-01 RX ORDER — LORAZEPAM 2 MG/ML
1 INJECTION INTRAMUSCULAR EVERY 6 HOURS PRN
Status: DISCONTINUED | OUTPATIENT
Start: 2023-01-01 | End: 2023-01-01

## 2023-01-01 RX ORDER — POLYETHYLENE GLYCOL 3350 17 G/17G
17 POWDER, FOR SOLUTION ORAL DAILY PRN
Status: DISCONTINUED | OUTPATIENT
Start: 2023-01-01 | End: 2023-01-01 | Stop reason: HOSPADM

## 2023-01-01 RX ORDER — LISINOPRIL 10 MG/1
10 TABLET ORAL DAILY
Status: DISCONTINUED | OUTPATIENT
Start: 2023-01-01 | End: 2023-01-01

## 2023-01-01 RX ORDER — METHYLPREDNISOLONE SODIUM SUCCINATE 40 MG/ML
40 INJECTION, POWDER, LYOPHILIZED, FOR SOLUTION INTRAMUSCULAR; INTRAVENOUS EVERY 8 HOURS
Status: DISCONTINUED | OUTPATIENT
Start: 2023-01-01 | End: 2023-01-01

## 2023-01-01 RX ORDER — BENZONATATE 100 MG/1
100 CAPSULE ORAL 3 TIMES DAILY PRN
Status: DISCONTINUED | OUTPATIENT
Start: 2023-01-01 | End: 2023-01-01

## 2023-01-01 RX ORDER — IBUPROFEN 600 MG/1
600 TABLET ORAL EVERY 6 HOURS PRN
Start: 2023-01-01

## 2023-01-01 RX ORDER — ALUMINA, MAGNESIA, AND SIMETHICONE 2400; 2400; 240 MG/30ML; MG/30ML; MG/30ML
15 SUSPENSION ORAL EVERY 6 HOURS PRN
Start: 2023-01-01

## 2023-01-01 RX ORDER — LORAZEPAM 2 MG/ML
1 INJECTION INTRAMUSCULAR EVERY 4 HOURS PRN
Status: DISCONTINUED | OUTPATIENT
Start: 2023-01-01 | End: 2023-01-01

## 2023-01-01 RX ORDER — MIRTAZAPINE 15 MG/1
15 TABLET, FILM COATED ORAL NIGHTLY
Status: DISCONTINUED | OUTPATIENT
Start: 2023-01-01 | End: 2023-01-01

## 2023-01-01 RX ORDER — ASCORBIC ACID 500 MG
1000 TABLET ORAL DAILY
Status: DISCONTINUED | OUTPATIENT
Start: 2023-01-01 | End: 2023-01-01

## 2023-01-01 RX ORDER — IBUPROFEN 600 MG/1
600 TABLET ORAL EVERY 6 HOURS PRN
Status: DISCONTINUED | OUTPATIENT
Start: 2023-01-01 | End: 2023-01-01 | Stop reason: HOSPADM

## 2023-01-01 RX ORDER — SIMETHICONE 80 MG
80 TABLET,CHEWABLE ORAL 4 TIMES DAILY PRN
Status: DISCONTINUED | OUTPATIENT
Start: 2023-01-01 | End: 2023-01-01

## 2023-01-01 RX ORDER — ASCORBIC ACID 500 MG
1000 TABLET ORAL DAILY
Status: DISCONTINUED | OUTPATIENT
Start: 2023-01-01 | End: 2023-01-01 | Stop reason: HOSPADM

## 2023-01-01 RX ORDER — AMOXICILLIN 250 MG
2 CAPSULE ORAL 2 TIMES DAILY PRN
Status: DISCONTINUED | OUTPATIENT
Start: 2023-01-01 | End: 2023-01-01 | Stop reason: HOSPADM

## 2023-01-01 RX ORDER — ONDANSETRON 4 MG/1
4 TABLET, FILM COATED ORAL EVERY 6 HOURS PRN
Start: 2023-01-01

## 2023-01-01 RX ORDER — DILTIAZEM HYDROCHLORIDE 120 MG/1
120 CAPSULE, COATED, EXTENDED RELEASE ORAL
Status: DISCONTINUED | OUTPATIENT
Start: 2023-01-01 | End: 2023-01-01

## 2023-01-01 RX ORDER — BUDESONIDE AND FORMOTEROL FUMARATE DIHYDRATE 160; 4.5 UG/1; UG/1
2 AEROSOL RESPIRATORY (INHALATION)
Status: DISCONTINUED | OUTPATIENT
Start: 2023-01-01 | End: 2023-01-01 | Stop reason: HOSPADM

## 2023-01-01 RX ORDER — ACETAMINOPHEN 325 MG/1
650 TABLET ORAL EVERY 4 HOURS PRN
Status: DISCONTINUED | OUTPATIENT
Start: 2023-01-01 | End: 2023-01-01

## 2023-01-01 RX ORDER — DEXTROMETHORPHAN POLISTIREX 30 MG/5ML
60 SUSPENSION ORAL EVERY 12 HOURS SCHEDULED
Status: DISCONTINUED | OUTPATIENT
Start: 2023-01-01 | End: 2023-01-01 | Stop reason: HOSPADM

## 2023-01-01 RX ORDER — PANTOPRAZOLE SODIUM 40 MG/1
40 TABLET, DELAYED RELEASE ORAL
Start: 2023-01-01

## 2023-01-01 RX ORDER — BENZONATATE 100 MG/1
100 CAPSULE ORAL 3 TIMES DAILY PRN
Status: DISCONTINUED | OUTPATIENT
Start: 2023-01-01 | End: 2023-01-01 | Stop reason: HOSPADM

## 2023-01-01 RX ORDER — LORAZEPAM 2 MG/ML
1 INJECTION INTRAMUSCULAR EVERY 6 HOURS PRN
Start: 2023-01-01 | End: 2023-01-01

## 2023-01-01 RX ORDER — BENZONATATE 100 MG/1
100 CAPSULE ORAL 3 TIMES DAILY PRN
Start: 2023-01-01

## 2023-01-01 RX ORDER — BISACODYL 5 MG/1
5 TABLET, DELAYED RELEASE ORAL DAILY PRN
Start: 2023-01-01

## 2023-01-01 RX ORDER — DEXAMETHASONE SODIUM PHOSPHATE 4 MG/ML
6 INJECTION, SOLUTION INTRA-ARTICULAR; INTRALESIONAL; INTRAMUSCULAR; INTRAVENOUS; SOFT TISSUE DAILY
Qty: 3 ML | Refills: 0 | Status: SHIPPED | OUTPATIENT
Start: 2023-01-01 | End: 2023-01-01

## 2023-01-01 RX ORDER — SODIUM CHLORIDE 9 MG/ML
40 INJECTION, SOLUTION INTRAVENOUS AS NEEDED
Start: 2023-01-01

## 2023-01-01 RX ORDER — PANTOPRAZOLE SODIUM 40 MG/1
40 TABLET, DELAYED RELEASE ORAL
Status: DISCONTINUED | OUTPATIENT
Start: 2023-01-01 | End: 2023-01-01 | Stop reason: HOSPADM

## 2023-01-01 RX ORDER — FUROSEMIDE 10 MG/ML
20 INJECTION INTRAMUSCULAR; INTRAVENOUS ONCE
Status: COMPLETED | OUTPATIENT
Start: 2023-01-01 | End: 2023-01-01

## 2023-01-01 RX ORDER — CHOLECALCIFEROL (VITAMIN D3) 125 MCG
5 CAPSULE ORAL NIGHTLY PRN
Status: DISCONTINUED | OUTPATIENT
Start: 2023-01-01 | End: 2023-01-01 | Stop reason: HOSPADM

## 2023-01-01 RX ORDER — ONDANSETRON 2 MG/ML
4 INJECTION INTRAMUSCULAR; INTRAVENOUS EVERY 4 HOURS PRN
Status: DISCONTINUED | OUTPATIENT
Start: 2023-01-01 | End: 2023-01-01

## 2023-01-01 RX ORDER — DEXTROMETHORPHAN POLISTIREX 30 MG/5ML
60 SUSPENSION ORAL EVERY 12 HOURS SCHEDULED
Status: DISCONTINUED | OUTPATIENT
Start: 2023-01-01 | End: 2023-01-01

## 2023-01-01 RX ORDER — ONDANSETRON 2 MG/ML
4 INJECTION INTRAMUSCULAR; INTRAVENOUS EVERY 6 HOURS PRN
Start: 2023-01-01

## 2023-01-01 RX ORDER — BISACODYL 5 MG/1
5 TABLET, DELAYED RELEASE ORAL DAILY PRN
Status: DISCONTINUED | OUTPATIENT
Start: 2023-01-01 | End: 2023-01-01 | Stop reason: HOSPADM

## 2023-01-01 RX ORDER — BISACODYL 10 MG
5 SUPPOSITORY, RECTAL RECTAL DAILY PRN
Status: DISCONTINUED | OUTPATIENT
Start: 2023-01-01 | End: 2023-01-01

## 2023-01-01 RX ORDER — PANTOPRAZOLE SODIUM 40 MG/10ML
40 INJECTION, POWDER, LYOPHILIZED, FOR SOLUTION INTRAVENOUS
Status: DISCONTINUED | OUTPATIENT
Start: 2023-01-01 | End: 2023-01-01

## 2023-01-01 RX ORDER — METOCLOPRAMIDE HYDROCHLORIDE 5 MG/ML
5 INJECTION INTRAMUSCULAR; INTRAVENOUS EVERY 6 HOURS PRN
Start: 2023-01-01

## 2023-01-01 RX ORDER — AMOXICILLIN 250 MG
2 CAPSULE ORAL 2 TIMES DAILY PRN
Start: 2023-01-01

## 2023-01-01 RX ORDER — DEXAMETHASONE SODIUM PHOSPHATE 4 MG/ML
6 INJECTION, SOLUTION INTRA-ARTICULAR; INTRALESIONAL; INTRAMUSCULAR; INTRAVENOUS; SOFT TISSUE DAILY
Status: DISCONTINUED | OUTPATIENT
Start: 2023-01-01 | End: 2023-01-01 | Stop reason: HOSPADM

## 2023-01-01 RX ORDER — CHOLECALCIFEROL (VITAMIN D3) 125 MCG
5 CAPSULE ORAL NIGHTLY PRN
Status: DISCONTINUED | OUTPATIENT
Start: 2023-01-01 | End: 2023-01-01

## 2023-01-01 RX ORDER — POLYETHYLENE GLYCOL 3350 17 G/17G
17 POWDER, FOR SOLUTION ORAL DAILY PRN
Start: 2023-01-01

## 2023-01-01 RX ORDER — BISACODYL 10 MG
10 SUPPOSITORY, RECTAL RECTAL DAILY PRN
Start: 2023-01-01

## 2023-01-01 RX ORDER — ALBUTEROL SULFATE 90 UG/1
2 AEROSOL, METERED RESPIRATORY (INHALATION)
Status: DISCONTINUED | OUTPATIENT
Start: 2023-01-01 | End: 2023-01-01 | Stop reason: HOSPADM

## 2023-01-01 RX ORDER — METOCLOPRAMIDE HYDROCHLORIDE 5 MG/ML
5 INJECTION INTRAMUSCULAR; INTRAVENOUS EVERY 6 HOURS PRN
Status: DISCONTINUED | OUTPATIENT
Start: 2023-01-01 | End: 2023-01-01 | Stop reason: HOSPADM

## 2023-01-01 RX ORDER — BISACODYL 10 MG
10 SUPPOSITORY, RECTAL RECTAL DAILY PRN
Status: DISCONTINUED | OUTPATIENT
Start: 2023-01-01 | End: 2023-01-01 | Stop reason: HOSPADM

## 2023-01-01 RX ORDER — ALUMINA, MAGNESIA, AND SIMETHICONE 2400; 2400; 240 MG/30ML; MG/30ML; MG/30ML
15 SUSPENSION ORAL
Status: DISCONTINUED | OUTPATIENT
Start: 2023-01-01 | End: 2023-01-01

## 2023-01-01 RX ORDER — LORAZEPAM 2 MG/ML
1 INJECTION INTRAMUSCULAR EVERY 6 HOURS PRN
Status: DISCONTINUED | OUTPATIENT
Start: 2023-01-01 | End: 2023-01-01 | Stop reason: HOSPADM

## 2023-01-01 RX ORDER — ALUMINA, MAGNESIA, AND SIMETHICONE 2400; 2400; 240 MG/30ML; MG/30ML; MG/30ML
15 SUSPENSION ORAL EVERY 6 HOURS PRN
Status: DISCONTINUED | OUTPATIENT
Start: 2023-01-01 | End: 2023-01-01 | Stop reason: HOSPADM

## 2023-01-01 RX ADMIN — BUDESONIDE AND FORMOTEROL FUMARATE DIHYDRATE 2 PUFF: 160; 4.5 AEROSOL RESPIRATORY (INHALATION) at 08:39

## 2023-01-01 RX ADMIN — Medication 10 ML: at 20:45

## 2023-01-01 RX ADMIN — ATORVASTATIN CALCIUM 20 MG: 20 TABLET, FILM COATED ORAL at 20:44

## 2023-01-01 RX ADMIN — IPRATROPIUM BROMIDE 2 PUFF: 17 AEROSOL, METERED RESPIRATORY (INHALATION) at 00:14

## 2023-01-01 RX ADMIN — ALBUTEROL SULFATE 2 PUFF: 90 AEROSOL, METERED RESPIRATORY (INHALATION) at 08:39

## 2023-01-01 RX ADMIN — IPRATROPIUM BROMIDE 2 PUFF: 17 AEROSOL, METERED RESPIRATORY (INHALATION) at 14:39

## 2023-01-01 RX ADMIN — RIVAROXABAN 20 MG: 10 TABLET, FILM COATED ORAL at 08:15

## 2023-01-01 RX ADMIN — SERTRALINE 100 MG: 50 TABLET, FILM COATED ORAL at 08:40

## 2023-01-01 RX ADMIN — DILTIAZEM HYDROCHLORIDE 120 MG: 120 CAPSULE, COATED, EXTENDED RELEASE ORAL at 11:15

## 2023-01-01 RX ADMIN — ALBUTEROL SULFATE 2 PUFF: 90 AEROSOL, METERED RESPIRATORY (INHALATION) at 14:39

## 2023-01-01 RX ADMIN — IPRATROPIUM BROMIDE 2 PUFF: 17 AEROSOL, METERED RESPIRATORY (INHALATION) at 22:11

## 2023-01-01 RX ADMIN — Medication 10 ML: at 21:59

## 2023-01-01 RX ADMIN — BUDESONIDE AND FORMOTEROL FUMARATE DIHYDRATE 2 PUFF: 160; 4.5 AEROSOL RESPIRATORY (INHALATION) at 21:41

## 2023-01-01 RX ADMIN — CEFTRIAXONE SODIUM 1 G: 1 INJECTION, POWDER, FOR SOLUTION INTRAMUSCULAR; INTRAVENOUS at 09:40

## 2023-01-01 RX ADMIN — IPRATROPIUM BROMIDE 2 PUFF: 17 AEROSOL, METERED RESPIRATORY (INHALATION) at 10:50

## 2023-01-01 RX ADMIN — DILTIAZEM HYDROCHLORIDE 120 MG: 120 CAPSULE, COATED, EXTENDED RELEASE ORAL at 09:41

## 2023-01-01 RX ADMIN — OXYCODONE HYDROCHLORIDE AND ACETAMINOPHEN 1000 MG: 500 TABLET ORAL at 09:39

## 2023-01-01 RX ADMIN — ALBUTEROL SULFATE 2 PUFF: 90 AEROSOL, METERED RESPIRATORY (INHALATION) at 21:30

## 2023-01-01 RX ADMIN — SERTRALINE 100 MG: 50 TABLET, FILM COATED ORAL at 08:02

## 2023-01-01 RX ADMIN — Medication 10 ML: at 07:56

## 2023-01-01 RX ADMIN — DEXAMETHASONE 6 MG: 2 TABLET ORAL at 08:31

## 2023-01-01 RX ADMIN — DEXTROMETHORPHAN 60 MG: 30 SUSPENSION, EXTENDED RELEASE ORAL at 20:04

## 2023-01-01 RX ADMIN — OXYCODONE HYDROCHLORIDE AND ACETAMINOPHEN 1000 MG: 500 TABLET ORAL at 11:15

## 2023-01-01 RX ADMIN — Medication 10 ML: at 20:04

## 2023-01-01 RX ADMIN — DEXTROMETHORPHAN 60 MG: 30 SUSPENSION, EXTENDED RELEASE ORAL at 22:28

## 2023-01-01 RX ADMIN — Medication 10 ML: at 09:40

## 2023-01-01 RX ADMIN — ATORVASTATIN CALCIUM 20 MG: 20 TABLET, FILM COATED ORAL at 20:05

## 2023-01-01 RX ADMIN — DEXTROMETHORPHAN 60 MG: 30 SUSPENSION, EXTENDED RELEASE ORAL at 20:36

## 2023-01-01 RX ADMIN — FUROSEMIDE 20 MG: 10 INJECTION, SOLUTION INTRAMUSCULAR; INTRAVENOUS at 10:38

## 2023-01-01 RX ADMIN — DEXTROMETHORPHAN 60 MG: 30 SUSPENSION, EXTENDED RELEASE ORAL at 08:01

## 2023-01-01 RX ADMIN — IPRATROPIUM BROMIDE 2 PUFF: 17 AEROSOL, METERED RESPIRATORY (INHALATION) at 21:41

## 2023-01-01 RX ADMIN — PANTOPRAZOLE SODIUM 40 MG: 40 INJECTION, POWDER, FOR SOLUTION INTRAVENOUS at 06:06

## 2023-01-01 RX ADMIN — ALBUTEROL SULFATE 2 PUFF: 90 AEROSOL, METERED RESPIRATORY (INHALATION) at 22:11

## 2023-01-01 RX ADMIN — IPRATROPIUM BROMIDE 2 PUFF: 17 AEROSOL, METERED RESPIRATORY (INHALATION) at 19:46

## 2023-01-01 RX ADMIN — ATORVASTATIN CALCIUM 20 MG: 20 TABLET, FILM COATED ORAL at 20:04

## 2023-01-01 RX ADMIN — IPRATROPIUM BROMIDE 2 PUFF: 17 AEROSOL, METERED RESPIRATORY (INHALATION) at 11:19

## 2023-01-01 RX ADMIN — DEXAMETHASONE 6 MG: 2 TABLET ORAL at 08:01

## 2023-01-01 RX ADMIN — ALBUTEROL SULFATE 2 PUFF: 90 AEROSOL, METERED RESPIRATORY (INHALATION) at 21:41

## 2023-01-01 RX ADMIN — BUDESONIDE AND FORMOTEROL FUMARATE DIHYDRATE 2 PUFF: 160; 4.5 AEROSOL RESPIRATORY (INHALATION) at 00:14

## 2023-01-01 RX ADMIN — IPRATROPIUM BROMIDE 2 PUFF: 17 AEROSOL, METERED RESPIRATORY (INHALATION) at 11:41

## 2023-01-01 RX ADMIN — ALBUTEROL SULFATE 2 PUFF: 90 AEROSOL, METERED RESPIRATORY (INHALATION) at 07:18

## 2023-01-01 RX ADMIN — ALBUTEROL SULFATE 2 PUFF: 90 AEROSOL, METERED RESPIRATORY (INHALATION) at 06:58

## 2023-01-01 RX ADMIN — ALBUTEROL SULFATE 2 PUFF: 90 AEROSOL, METERED RESPIRATORY (INHALATION) at 06:27

## 2023-01-01 RX ADMIN — IPRATROPIUM BROMIDE 2 PUFF: 17 AEROSOL, METERED RESPIRATORY (INHALATION) at 06:25

## 2023-01-01 RX ADMIN — BUDESONIDE AND FORMOTEROL FUMARATE DIHYDRATE 2 PUFF: 160; 4.5 AEROSOL RESPIRATORY (INHALATION) at 22:11

## 2023-01-01 RX ADMIN — DILTIAZEM HYDROCHLORIDE 120 MG: 120 CAPSULE, COATED, EXTENDED RELEASE ORAL at 08:15

## 2023-01-01 RX ADMIN — Medication 10 ML: at 08:52

## 2023-01-01 RX ADMIN — LORAZEPAM 1 MG: 2 INJECTION INTRAMUSCULAR; INTRAVENOUS at 03:14

## 2023-01-01 RX ADMIN — BUDESONIDE AND FORMOTEROL FUMARATE DIHYDRATE 2 PUFF: 160; 4.5 AEROSOL RESPIRATORY (INHALATION) at 06:58

## 2023-01-01 RX ADMIN — ALBUTEROL SULFATE 2 PUFF: 90 AEROSOL, METERED RESPIRATORY (INHALATION) at 14:12

## 2023-01-01 RX ADMIN — IPRATROPIUM BROMIDE 2 PUFF: 17 AEROSOL, METERED RESPIRATORY (INHALATION) at 15:02

## 2023-01-01 RX ADMIN — DEXTROMETHORPHAN 60 MG: 30 SUSPENSION, EXTENDED RELEASE ORAL at 08:52

## 2023-01-01 RX ADMIN — ATORVASTATIN CALCIUM 20 MG: 20 TABLET, FILM COATED ORAL at 20:08

## 2023-01-01 RX ADMIN — ATORVASTATIN CALCIUM 20 MG: 20 TABLET, FILM COATED ORAL at 20:36

## 2023-01-01 RX ADMIN — BUDESONIDE AND FORMOTEROL FUMARATE DIHYDRATE 2 PUFF: 160; 4.5 AEROSOL RESPIRATORY (INHALATION) at 23:50

## 2023-01-01 RX ADMIN — IPRATROPIUM BROMIDE 2 PUFF: 17 AEROSOL, METERED RESPIRATORY (INHALATION) at 14:54

## 2023-01-01 RX ADMIN — IPRATROPIUM BROMIDE 2 PUFF: 17 AEROSOL, METERED RESPIRATORY (INHALATION) at 07:58

## 2023-01-01 RX ADMIN — LISINOPRIL 10 MG: 10 TABLET ORAL at 08:52

## 2023-01-01 RX ADMIN — AZITHROMYCIN MONOHYDRATE 500 MG: 500 INJECTION, POWDER, LYOPHILIZED, FOR SOLUTION INTRAVENOUS at 18:29

## 2023-01-01 RX ADMIN — ALBUTEROL SULFATE 2 PUFF: 90 AEROSOL, METERED RESPIRATORY (INHALATION) at 10:56

## 2023-01-01 RX ADMIN — AZITHROMYCIN MONOHYDRATE 500 MG: 500 INJECTION, POWDER, LYOPHILIZED, FOR SOLUTION INTRAVENOUS at 12:13

## 2023-01-01 RX ADMIN — CEFTRIAXONE SODIUM 1 G: 1 INJECTION, POWDER, FOR SOLUTION INTRAMUSCULAR; INTRAVENOUS at 09:02

## 2023-01-01 RX ADMIN — AZITHROMYCIN MONOHYDRATE 500 MG: 500 INJECTION, POWDER, LYOPHILIZED, FOR SOLUTION INTRAVENOUS at 08:02

## 2023-01-01 RX ADMIN — RIVAROXABAN 20 MG: 10 TABLET, FILM COATED ORAL at 07:56

## 2023-01-01 RX ADMIN — Medication 10 ML: at 11:10

## 2023-01-01 RX ADMIN — ALBUTEROL SULFATE 2 PUFF: 90 AEROSOL, METERED RESPIRATORY (INHALATION) at 06:25

## 2023-01-01 RX ADMIN — DEXTROMETHORPHAN 60 MG: 30 SUSPENSION, EXTENDED RELEASE ORAL at 20:05

## 2023-01-01 RX ADMIN — DEXTROMETHORPHAN 60 MG: 30 SUSPENSION, EXTENDED RELEASE ORAL at 09:41

## 2023-01-01 RX ADMIN — DEXAMETHASONE 6 MG: 2 TABLET ORAL at 07:55

## 2023-01-01 RX ADMIN — DEXTROMETHORPHAN 60 MG: 30 SUSPENSION, EXTENDED RELEASE ORAL at 11:10

## 2023-01-01 RX ADMIN — ALBUTEROL SULFATE 2 PUFF: 90 AEROSOL, METERED RESPIRATORY (INHALATION) at 11:08

## 2023-01-01 RX ADMIN — ALBUTEROL SULFATE 2 PUFF: 90 AEROSOL, METERED RESPIRATORY (INHALATION) at 15:00

## 2023-01-01 RX ADMIN — ALBUTEROL SULFATE 2 PUFF: 90 AEROSOL, METERED RESPIRATORY (INHALATION) at 14:56

## 2023-01-01 RX ADMIN — IPRATROPIUM BROMIDE 2 PUFF: 17 AEROSOL, METERED RESPIRATORY (INHALATION) at 14:12

## 2023-01-01 RX ADMIN — IPRATROPIUM BROMIDE 2 PUFF: 17 AEROSOL, METERED RESPIRATORY (INHALATION) at 11:08

## 2023-01-01 RX ADMIN — PANTOPRAZOLE SODIUM 40 MG: 40 TABLET, DELAYED RELEASE ORAL at 06:30

## 2023-01-01 RX ADMIN — IPRATROPIUM BROMIDE 2 PUFF: 17 AEROSOL, METERED RESPIRATORY (INHALATION) at 10:56

## 2023-01-01 RX ADMIN — BUDESONIDE AND FORMOTEROL FUMARATE DIHYDRATE 2 PUFF: 160; 4.5 AEROSOL RESPIRATORY (INHALATION) at 07:58

## 2023-01-01 RX ADMIN — ALBUTEROL SULFATE 2 PUFF: 90 AEROSOL, METERED RESPIRATORY (INHALATION) at 10:50

## 2023-01-01 RX ADMIN — OXYCODONE HYDROCHLORIDE AND ACETAMINOPHEN 1000 MG: 500 TABLET ORAL at 08:52

## 2023-01-01 RX ADMIN — DEXAMETHASONE 6 MG: 2 TABLET ORAL at 08:52

## 2023-01-01 RX ADMIN — PANTOPRAZOLE SODIUM 40 MG: 40 INJECTION, POWDER, FOR SOLUTION INTRAVENOUS at 05:57

## 2023-01-01 RX ADMIN — ALBUTEROL SULFATE 2 PUFF: 90 AEROSOL, METERED RESPIRATORY (INHALATION) at 07:46

## 2023-01-01 RX ADMIN — LISINOPRIL 10 MG: 10 TABLET ORAL at 07:56

## 2023-01-01 RX ADMIN — BUDESONIDE AND FORMOTEROL FUMARATE DIHYDRATE 2 PUFF: 160; 4.5 AEROSOL RESPIRATORY (INHALATION) at 06:27

## 2023-01-01 RX ADMIN — ALBUTEROL SULFATE 2 PUFF: 90 AEROSOL, METERED RESPIRATORY (INHALATION) at 11:19

## 2023-01-01 RX ADMIN — OXYCODONE HYDROCHLORIDE AND ACETAMINOPHEN 1000 MG: 500 TABLET ORAL at 08:01

## 2023-01-01 RX ADMIN — BUDESONIDE AND FORMOTEROL FUMARATE DIHYDRATE 2 PUFF: 160; 4.5 AEROSOL RESPIRATORY (INHALATION) at 07:46

## 2023-01-01 RX ADMIN — DEXTROMETHORPHAN 60 MG: 30 SUSPENSION, EXTENDED RELEASE ORAL at 08:32

## 2023-01-01 RX ADMIN — CEFTRIAXONE SODIUM 1 G: 1 INJECTION, POWDER, FOR SOLUTION INTRAMUSCULAR; INTRAVENOUS at 11:02

## 2023-01-01 RX ADMIN — IBUPROFEN 600 MG: 600 TABLET, FILM COATED ORAL at 12:41

## 2023-01-01 RX ADMIN — IPRATROPIUM BROMIDE 2 PUFF: 17 AEROSOL, METERED RESPIRATORY (INHALATION) at 08:39

## 2023-01-01 RX ADMIN — PANTOPRAZOLE SODIUM 40 MG: 40 INJECTION, POWDER, FOR SOLUTION INTRAVENOUS at 05:47

## 2023-01-01 RX ADMIN — IPRATROPIUM BROMIDE 2 PUFF: 17 AEROSOL, METERED RESPIRATORY (INHALATION) at 23:50

## 2023-01-01 RX ADMIN — LISINOPRIL 10 MG: 10 TABLET ORAL at 08:23

## 2023-01-01 RX ADMIN — OXYCODONE HYDROCHLORIDE AND ACETAMINOPHEN 1000 MG: 500 TABLET ORAL at 07:56

## 2023-01-01 RX ADMIN — CEFTRIAXONE SODIUM 1 G: 1 INJECTION, POWDER, FOR SOLUTION INTRAMUSCULAR; INTRAVENOUS at 08:02

## 2023-01-01 RX ADMIN — RIVAROXABAN 20 MG: 10 TABLET, FILM COATED ORAL at 08:02

## 2023-01-01 RX ADMIN — DEXTROMETHORPHAN 60 MG: 30 SUSPENSION, EXTENDED RELEASE ORAL at 07:56

## 2023-01-01 RX ADMIN — SERTRALINE 100 MG: 50 TABLET, FILM COATED ORAL at 07:56

## 2023-01-01 RX ADMIN — AZITHROMYCIN MONOHYDRATE 500 MG: 500 INJECTION, POWDER, LYOPHILIZED, FOR SOLUTION INTRAVENOUS at 09:40

## 2023-01-01 RX ADMIN — AZITHROMYCIN MONOHYDRATE 500 MG: 500 INJECTION, POWDER, LYOPHILIZED, FOR SOLUTION INTRAVENOUS at 09:34

## 2023-01-01 RX ADMIN — IPRATROPIUM BROMIDE 2 PUFF: 17 AEROSOL, METERED RESPIRATORY (INHALATION) at 11:36

## 2023-01-01 RX ADMIN — DEXAMETHASONE 6 MG: 2 TABLET ORAL at 09:40

## 2023-01-01 RX ADMIN — IPRATROPIUM BROMIDE 2 PUFF: 17 AEROSOL, METERED RESPIRATORY (INHALATION) at 14:56

## 2023-01-01 RX ADMIN — Medication 10 ML: at 20:05

## 2023-01-01 RX ADMIN — DILTIAZEM HYDROCHLORIDE 120 MG: 120 CAPSULE, COATED, EXTENDED RELEASE ORAL at 11:07

## 2023-01-01 RX ADMIN — ALBUTEROL SULFATE 2 PUFF: 90 AEROSOL, METERED RESPIRATORY (INHALATION) at 08:21

## 2023-01-01 RX ADMIN — ALBUTEROL SULFATE 2 PUFF: 90 AEROSOL, METERED RESPIRATORY (INHALATION) at 11:41

## 2023-01-01 RX ADMIN — OXYCODONE HYDROCHLORIDE AND ACETAMINOPHEN 1000 MG: 500 TABLET ORAL at 11:06

## 2023-01-01 RX ADMIN — DEXAMETHASONE 6 MG: 2 TABLET ORAL at 08:14

## 2023-01-01 RX ADMIN — IPRATROPIUM BROMIDE 2 PUFF: 17 AEROSOL, METERED RESPIRATORY (INHALATION) at 11:39

## 2023-01-01 RX ADMIN — SERTRALINE 100 MG: 50 TABLET, FILM COATED ORAL at 08:31

## 2023-01-01 RX ADMIN — Medication 10 ML: at 08:14

## 2023-01-01 RX ADMIN — DEXAMETHASONE 6 MG: 2 TABLET ORAL at 11:15

## 2023-01-01 RX ADMIN — SERTRALINE 100 MG: 50 TABLET, FILM COATED ORAL at 08:52

## 2023-01-01 RX ADMIN — IPRATROPIUM BROMIDE 2 PUFF: 17 AEROSOL, METERED RESPIRATORY (INHALATION) at 07:46

## 2023-01-01 RX ADMIN — CEFTRIAXONE SODIUM 1 G: 1 INJECTION, POWDER, FOR SOLUTION INTRAMUSCULAR; INTRAVENOUS at 08:34

## 2023-01-01 RX ADMIN — ALBUTEROL SULFATE 2 PUFF: 90 AEROSOL, METERED RESPIRATORY (INHALATION) at 23:50

## 2023-01-01 RX ADMIN — ONDANSETRON 4 MG: 2 INJECTION INTRAMUSCULAR; INTRAVENOUS at 08:47

## 2023-01-01 RX ADMIN — ALBUTEROL SULFATE 2 PUFF: 90 AEROSOL, METERED RESPIRATORY (INHALATION) at 00:14

## 2023-01-01 RX ADMIN — PANTOPRAZOLE SODIUM 40 MG: 40 TABLET, DELAYED RELEASE ORAL at 11:02

## 2023-01-01 RX ADMIN — SERTRALINE 100 MG: 50 TABLET, FILM COATED ORAL at 08:14

## 2023-01-01 RX ADMIN — Medication 10 ML: at 20:08

## 2023-01-01 RX ADMIN — ALBUTEROL SULFATE 2 PUFF: 90 AEROSOL, METERED RESPIRATORY (INHALATION) at 19:46

## 2023-01-01 RX ADMIN — BUDESONIDE AND FORMOTEROL FUMARATE DIHYDRATE 2 PUFF: 160; 4.5 AEROSOL RESPIRATORY (INHALATION) at 21:30

## 2023-01-01 RX ADMIN — IPRATROPIUM BROMIDE 2 PUFF: 17 AEROSOL, METERED RESPIRATORY (INHALATION) at 16:14

## 2023-01-01 RX ADMIN — DEXTROMETHORPHAN 60 MG: 30 SUSPENSION, EXTENDED RELEASE ORAL at 20:45

## 2023-01-01 RX ADMIN — SERTRALINE 100 MG: 50 TABLET, FILM COATED ORAL at 11:08

## 2023-01-01 RX ADMIN — IPRATROPIUM BROMIDE 2 PUFF: 17 AEROSOL, METERED RESPIRATORY (INHALATION) at 06:58

## 2023-01-01 RX ADMIN — RIVAROXABAN 20 MG: 10 TABLET, FILM COATED ORAL at 08:52

## 2023-01-01 RX ADMIN — Medication 10 ML: at 20:39

## 2023-01-01 RX ADMIN — ALBUTEROL SULFATE 2 PUFF: 90 AEROSOL, METERED RESPIRATORY (INHALATION) at 14:54

## 2023-01-01 RX ADMIN — RIVAROXABAN 20 MG: 10 TABLET, FILM COATED ORAL at 11:08

## 2023-01-01 RX ADMIN — IPRATROPIUM BROMIDE 2 PUFF: 17 AEROSOL, METERED RESPIRATORY (INHALATION) at 07:18

## 2023-01-01 RX ADMIN — ALUMINUM HYDROXIDE, MAGNESIUM HYDROXIDE, AND DIMETHICONE 15 ML: 400; 400; 40 SUSPENSION ORAL at 10:38

## 2023-01-01 RX ADMIN — ALBUTEROL SULFATE 2 PUFF: 90 AEROSOL, METERED RESPIRATORY (INHALATION) at 16:14

## 2023-01-01 RX ADMIN — ALUMINUM HYDROXIDE, MAGNESIUM HYDROXIDE, AND DIMETHICONE 15 ML: 400; 400; 40 SUSPENSION ORAL at 05:54

## 2023-01-01 RX ADMIN — DEXAMETHASONE SODIUM PHOSPHATE 6 MG: 4 INJECTION, SOLUTION INTRAMUSCULAR; INTRAVENOUS at 11:06

## 2023-01-01 RX ADMIN — ACETAMINOPHEN 650 MG: 325 TABLET ORAL at 11:15

## 2023-01-01 RX ADMIN — RIVAROXABAN 20 MG: 10 TABLET, FILM COATED ORAL at 08:31

## 2023-01-01 RX ADMIN — ALBUTEROL SULFATE 2 PUFF: 90 AEROSOL, METERED RESPIRATORY (INHALATION) at 11:36

## 2023-01-01 RX ADMIN — IPRATROPIUM BROMIDE 2 PUFF: 17 AEROSOL, METERED RESPIRATORY (INHALATION) at 08:21

## 2023-01-01 RX ADMIN — ALBUTEROL SULFATE 2 PUFF: 90 AEROSOL, METERED RESPIRATORY (INHALATION) at 07:58

## 2023-01-01 RX ADMIN — DEXTROMETHORPHAN 60 MG: 30 SUSPENSION, EXTENDED RELEASE ORAL at 08:48

## 2023-01-01 RX ADMIN — DILTIAZEM HYDROCHLORIDE 120 MG: 120 CAPSULE, COATED, EXTENDED RELEASE ORAL at 08:32

## 2023-01-01 RX ADMIN — DILTIAZEM HYDROCHLORIDE 120 MG: 120 CAPSULE, COATED, EXTENDED RELEASE ORAL at 08:01

## 2023-01-01 RX ADMIN — Medication 10 ML: at 08:02

## 2023-01-01 RX ADMIN — Medication 10 ML: at 22:35

## 2023-01-01 RX ADMIN — BUDESONIDE AND FORMOTEROL FUMARATE DIHYDRATE 2 PUFF: 160; 4.5 AEROSOL RESPIRATORY (INHALATION) at 06:25

## 2023-01-01 RX ADMIN — IPRATROPIUM BROMIDE 2 PUFF: 17 AEROSOL, METERED RESPIRATORY (INHALATION) at 15:00

## 2023-01-01 RX ADMIN — ATORVASTATIN CALCIUM 20 MG: 20 TABLET, FILM COATED ORAL at 21:21

## 2023-01-01 RX ADMIN — Medication 10 ML: at 11:03

## 2023-01-01 RX ADMIN — OXYCODONE HYDROCHLORIDE AND ACETAMINOPHEN 1000 MG: 500 TABLET ORAL at 08:31

## 2023-01-01 RX ADMIN — OXYCODONE HYDROCHLORIDE AND ACETAMINOPHEN 1000 MG: 500 TABLET ORAL at 08:15

## 2023-01-01 RX ADMIN — BUDESONIDE AND FORMOTEROL FUMARATE DIHYDRATE 2 PUFF: 160; 4.5 AEROSOL RESPIRATORY (INHALATION) at 08:21

## 2023-01-01 RX ADMIN — Medication 10 ML: at 22:28

## 2023-01-01 RX ADMIN — SERTRALINE 100 MG: 50 TABLET, FILM COATED ORAL at 09:40

## 2023-01-01 RX ADMIN — DEXTROMETHORPHAN 60 MG: 30 SUSPENSION, EXTENDED RELEASE ORAL at 21:22

## 2023-01-01 RX ADMIN — ALBUTEROL SULFATE 2 PUFF: 90 AEROSOL, METERED RESPIRATORY (INHALATION) at 11:39

## 2023-01-01 RX ADMIN — RIVAROXABAN 20 MG: 10 TABLET, FILM COATED ORAL at 09:40

## 2023-01-01 RX ADMIN — IPRATROPIUM BROMIDE 2 PUFF: 17 AEROSOL, METERED RESPIRATORY (INHALATION) at 06:28

## 2023-01-01 RX ADMIN — DEXTROMETHORPHAN 60 MG: 30 SUSPENSION, EXTENDED RELEASE ORAL at 21:00

## 2023-01-01 RX ADMIN — ALBUTEROL SULFATE 2 PUFF: 90 AEROSOL, METERED RESPIRATORY (INHALATION) at 15:01

## 2023-01-01 RX ADMIN — BUDESONIDE AND FORMOTEROL FUMARATE DIHYDRATE 2 PUFF: 160; 4.5 AEROSOL RESPIRATORY (INHALATION) at 07:19

## 2023-01-01 RX ADMIN — DILTIAZEM HYDROCHLORIDE 120 MG: 120 CAPSULE, COATED, EXTENDED RELEASE ORAL at 07:56

## 2023-01-01 RX ADMIN — AZITHROMYCIN MONOHYDRATE 500 MG: 500 INJECTION, POWDER, LYOPHILIZED, FOR SOLUTION INTRAVENOUS at 09:02

## 2023-01-01 RX ADMIN — DEXTROMETHORPHAN 60 MG: 30 SUSPENSION, EXTENDED RELEASE ORAL at 21:01

## 2023-01-01 RX ADMIN — DILTIAZEM HYDROCHLORIDE 120 MG: 120 CAPSULE, COATED, EXTENDED RELEASE ORAL at 08:52

## 2023-01-01 RX ADMIN — BUDESONIDE AND FORMOTEROL FUMARATE DIHYDRATE 2 PUFF: 160; 4.5 AEROSOL RESPIRATORY (INHALATION) at 19:46

## 2023-01-01 RX ADMIN — ATORVASTATIN CALCIUM 20 MG: 20 TABLET, FILM COATED ORAL at 20:42

## 2023-01-01 RX ADMIN — IPRATROPIUM BROMIDE 2 PUFF: 17 AEROSOL, METERED RESPIRATORY (INHALATION) at 21:30

## 2023-01-01 RX ADMIN — ALUMINUM HYDROXIDE, MAGNESIUM HYDROXIDE, AND DIMETHICONE 15 ML: 400; 400; 40 SUSPENSION ORAL at 20:36

## 2023-01-01 RX ADMIN — RIVAROXABAN 20 MG: 10 TABLET, FILM COATED ORAL at 08:39

## 2023-01-01 RX ADMIN — DEXTROMETHORPHAN 60 MG: 30 SUSPENSION, EXTENDED RELEASE ORAL at 11:15

## 2023-02-23 PROBLEM — J12.82 PNEUMONIA DUE TO COVID-19 VIRUS: Status: ACTIVE | Noted: 2023-01-01

## 2023-02-23 PROBLEM — U07.1 PNEUMONIA DUE TO COVID-19 VIRUS: Status: ACTIVE | Noted: 2023-01-01

## 2023-02-25 PROBLEM — U07.1 COVID-19: Status: ACTIVE | Noted: 2023-01-01

## 2023-03-01 NOTE — PROGRESS NOTES
[facility] Medicine Services  INPATIENT PROGRESS NOTE    Length of Stay: 4  Date of Admission: 2/23/2023  Primary Care Physician: Ethan Harris MD    Subjective   Chief Complaint: Shortness of breath  HPI: Resting comfortably today.  Shortness of breath is some better.  Weaned to Airvo this morning.    As of today, 03/01/23  Review of Systems   Unable to perform ROS: Acuity of condition        All pertinent negatives and positives are as above. All other systems have been reviewed and are negative unless otherwise stated.    Objective    Temp:  [96.8 °F (36 °C)-97.5 °F (36.4 °C)] 97.5 °F (36.4 °C)  Heart Rate:  [65-89] 79  Resp:  [18-28] 27  BP: ()/(50-75) 135/62    AM-PAC 6 Clicks Score (PT): 16 (03/01/23 1600)    As of today, 03/01/23  Physical Exam  Vitals reviewed.   Constitutional:       Appearance: He is well-developed.   HENT:      Head: Normocephalic and atraumatic.   Eyes:      Pupils: Pupils are equal, round, and reactive to light.   Cardiovascular:      Rate and Rhythm: Normal rate and regular rhythm.      Heart sounds: Normal heart sounds. No murmur heard.    No friction rub. No gallop.   Pulmonary:      Effort: Pulmonary effort is normal. No respiratory distress.      Breath sounds: Decreased breath sounds present. No wheezing or rales.      Comments: On Airvo  Chest:      Chest wall: No tenderness.   Abdominal:      General: Bowel sounds are normal. There is no distension.      Palpations: Abdomen is soft.      Tenderness: There is no abdominal tenderness.   Musculoskeletal:      Cervical back: Normal range of motion and neck supple.   Psychiatric:         Behavior: Behavior normal.           Results Review:  I have reviewed the labs, radiology results, and diagnostic studies.    Laboratory Data:   Results from last 7 days   Lab Units 03/01/23  0333 02/28/23  0421 02/27/23  0850 02/26/23  0650 02/25/23  0542   SODIUM mmol/L 135* 135* 134* 136  135*   POTASSIUM mmol/L 4.6 4.7 4.5 5.1 4.2   CHLORIDE mmol/L 104 102 102 104 102   CO2 mmol/L 22.0 25.0 22.0 24.0 22.0   BUN mg/dL 38* 39* 41* 40* 36*   CREATININE mg/dL 1.35* 1.53* 1.66* 1.64* 1.57*   GLUCOSE mg/dL 125* 129* 111* 114* 112*   CALCIUM mg/dL 8.6 8.6 8.7 8.7 8.6   BILIRUBIN mg/dL  --   --  0.5 0.4 0.3   ALK PHOS U/L  --   --  67 68 79   ALT (SGPT) U/L  --   --  30 26 32   AST (SGOT) U/L  --   --  30 26 38   ANION GAP mmol/L 9.0 8.0 10.0 8.0 11.0     Estimated Creatinine Clearance: 65 mL/min (A) (by C-G formula based on SCr of 1.35 mg/dL (H)).          Results from last 7 days   Lab Units 03/01/23  0333 02/28/23  0421 02/27/23  0850 02/26/23  0650 02/25/23  0542   WBC 10*3/mm3 9.39 10.32 12.06* 9.08 10.28   HEMOGLOBIN g/dL 12.9* 12.9* 14.1 14.0 14.1   HEMATOCRIT % 37.3* 38.8 41.2 40.1 42.2   PLATELETS 10*3/mm3 224 197 242 196 210           Culture Data:   Blood Culture   Date Value Ref Range Status   02/27/2023 No growth at 2 days  Preliminary   02/27/2023 No growth at 2 days  Preliminary     No results found for: URINECX  No results found for: RESPCX  No results found for: WOUNDCX  No results found for: STOOLCX  No components found for: BODYFLD    Radiology Data:   Imaging Results (Last 24 Hours)     ** No results found for the last 24 hours. **          I have utilized all available immediate resources to obtain, update, or review the patient's current medications (including all prescriptions, over-the-counter products, herbals, cannabis/cannabidiol products, and vitamin/mineral/dietary (nutritional) supplements).       Assessment/Plan     Active Hospital Problems    Diagnosis    • **Pneumonia due to COVID-19 virus    • COVID-19        Plan:  1.  Acute hypoxic respiratory failure: Secondary to COVID-19 pneumonia.  Weaned to Airvo this morning.  Patient is DNI.  Continue to wean supplemental oxygen as able.  2.  COVID-19 infection: Continue Decadron, bronchodilators, inhaled steroids, and  anticoagulation.  We will continue antibiotics due to elevated procalcitonin.  3.  COPD: Does not appear to be in acute exacerbation.  4.  History of atrial fibrillation: Continue Cardizem and Xarelto.  5.  Depression: Zoloft  6.  DVT prophylaxis: Xarelto      Medical Decision Making  Number and Complexity of problems: 2 highly complex medical problems    Conditions and Status:        Condition is unchanged.     [facility] Data  External documents reviewed: Outpatient notes  My plain film interpretation: Right middle and bilateral lower lobe infiltrates    Treatment Plan  As above    I spent 32 minutes providing critical care management to this patient.  This excludes time spent in performing separately billed procedures.    Care Planning  Shared decision making: Plan of care agreed upon  Code status and discussions: Patient does not want to be intubated, but does want CPR    Disposition  Social Determinants of Health that impact treatment or disposition: None.  I expect the patient to be discharged to home versus LTAC versus skilled facility in 4-5 days.       The patient was evaluated during the global COVID-19 pandemic, and the diagnosis was suspected/considered upon their initial presentation.  Evaluation, treatment, and testing were consistent with current guidelines for patients who present with complaints or symptoms that may be related to COVID-19.    I confirmed that the patient's Advance Care Plan is present, code status is documented, or surrogate decision maker is listed in the patient's medical record.        This document has been electronically signed by Alpesh Larkin MD on March 1, 2023 17:00 CST

## 2023-03-01 NOTE — PLAN OF CARE
Goal Outcome Evaluation:   Patient resting in bed on CPAP. VSS. Voids with adequate UOP. Patient has become more anxious this shift, PRN ativan given.

## 2023-03-01 NOTE — PROGRESS NOTES
"Adult Nutrition  Assessment/PES    Patient Name:  Son Hughes Case  YOB: 1950  MRN: [id number]  Admit Date:  2/23/2023    Assessment Date:  3/1/2023    Comments:  Pt remains in ICU for treatment of +COVID 19 w/ hx COPD. RN note pt is back and forth on MAX AirVo to bipap. Cardiac diet w/ no intakes available. Boost Plus all meals to optimize intakes- unsure that he is accepeting. Rn notes intake is poor and that snacks are available from family. 2/23/23 wt 220# and 2/27 216# ---# w/ wt loss of 4.1% in approx 1 wk. Pt meets criteria for severe acute malnutrition r/t acute illness. RD unable to speak w/ pt or complete NFPE r/t COVID isolation in ICU. Also noted last +BM 2/24- RN aware and will look into. Will continue to monitor if renal labs improve to possibly d/c cardiac restrictions to optimize intake. RD following.               Reason for Assessment     Row Name 03/01/23 1050          Reason for Assessment    Reason For Assessment follow-up protocol     Diagnosis infection/sepsis     Identified At Risk by Screening Criteria reduced oral intake over the last month;unintentional loss of 10 lbs or more in the past 2 mos                Nutrition/Diet History     Row Name 03/01/23 1051          Nutrition/Diet History    Typical Intake (Food/Fluid/EN/PN) Unable to speak w/ over phone in ICU                    Estimated/Assessed Needs - Anthropometrics     Row Name 03/01/23 0600          Anthropometrics    Height 188 cm (74\")     Weight 95.8 kg (211 lb 3.2 oz)                Nutrition Prescription Ordered     Row Name 03/01/23 1051          Nutrition Prescription PO    Current PO Diet Soft Texture     Texture Chopped     Supplement Boost Plus (Ensure Enlive, Ensure Plus)     Supplement Frequency 3 times a day     Common Modifiers Cardiac                Evaluation of Received Nutrient/Fluid Intake     Row Name 03/01/23 1051          PO Evaluation    Number of Days PO Intake Evaluated Insufficient " Data     % PO Intake RN reports poor in ccu                Malnutrition Severity Assessment     Row Name 03/01/23 1052          Malnutrition Severity Assessment    Malnutrition Type Acute Disease or Injury - Related Malnutrition        Insufficient Energy Intake     Insufficient Energy Intake Findings Severe     Insufficient Energy Intake  <75% of est. energy requirement for >7d)        Unintentional Weight Loss     Unintentional Weight Loss Findings Severe     Unintentional Weight Loss  Weight loss greater than 2% in one week  -4.1% approx 1 wk        Criteria Met (Must meet criteria for severity in at least 2 of these categories: M Wasting, Fat Loss, Fluid, Secondary Signs, Wt. Status, Intake)    Patient meets criteria for  Severe Malnutrition  unable to complete NFPE d/t COVID isolation                 Problem/Interventions:   Problem 1     Row Name 03/01/23 1052          Nutrition Diagnoses Problem 1    Problem 1 Predicted Suboptimal Intake     Etiology (related to) Medical Diagnosis;Factors Affecting Nutrition     Infectious Disease Other (comment)  +COVID 19     Appetite Fair;Poor     Food Habit/Preferences Limited Food Preferences  per daughter     Oral Mouth Pain     Signs/Symptoms (evidenced by) Report of Mnimal PO Intake                Problem 2     Row Name 03/01/23 1053          Nutrition Diagnoses Problem 2    Problem 2 Malnutrition  severe acute     Etiology (related to) Medical Diagnosis;Factors Affecting Nutrition     Infectious Disease Other (comment)  +COVID     Pulmonary/Critical Care Acute respiratory failure;biPAP     Reported/Observed By Patient;Family     Appetite Poor     Food Habit/Preferences Limited Food Preferences     Oral Chewing Difficulty;Mouth Pain     Signs/Symptoms (evidenced by) Report of Minimal PO Intake;Unintended Weight Change     Unintended Weight Change Loss     Number of Pounds Lost -4.1%     Weight loss time period approx 1 wk, significant                    Intervention  Goal     Row Name 03/01/23 1054          Intervention Goal    General Maintain nutrition;Reduce/improve symptoms;Improved nutrition related lab(s);Meet nutritional needs for age/condition     PO Tolerate PO;Meet estimated needs     Weight Maintain weight                Nutrition Intervention     Row Name 03/01/23 1055          Nutrition Intervention    RD/Tech Action Follow Tx progress;Care plan reviewd                  Education/Evaluation     Row Name 03/01/23 1055          Education    Education Will Instruct as appropriate        Monitor/Evaluation    Monitor PO intake;Supplement intake;Pertinent labs;Weight;Symptoms     Education Follow-up Reinforce PRN                 Electronically signed by:  Lydia Demarco RD  03/01/23 10:55 CST

## 2023-03-02 NOTE — PLAN OF CARE
Patient on CPAP, vital signs are stable. Patient was on Airvo most of the day. Patient is currently resting in bed.

## 2023-03-02 NOTE — PROGRESS NOTES
[facility] Medicine Services  INPATIENT PROGRESS NOTE     Date of Admission: 2023  Primary Care Physician: Ethan Harris MD    NAME: Son Hughes Case  : 1950  MRN: [id number]      LOS: 5 days     PROVIDER OF SERVICE: Austin Ernandez MD    Chief Complaint: Pneumonia due to COVID-19 virus    Subjective:     Interval History:  History taken from: patient RN  Patient Complaints: Tired.   HPI: Per RN patient had required increased oxygen demand overnight and was tolerating heated high flow with some difficulty which required him to use more CPAP overnight. He has continued to participate in respiratory exercises.     As of today, 3/02/23  Review of Systems:   Limited by fatigue  Review of Systems   Constitutional: Positive for fatigue. Negative for activity change, appetite change, chills and fever.   HENT: Negative for sore throat and trouble swallowing.    Respiratory: Positive for cough and shortness of breath.    Gastrointestinal: Negative for abdominal pain.   Genitourinary: Negative for decreased urine volume.   Skin: Negative for wound.   Neurological: Positive for weakness.   Hematological: Does not bruise/bleed easily.   Psychiatric/Behavioral: Positive for dysphoric mood and sleep disturbance. Negative for confusion and decreased concentration.       Objective:     Vital Signs  Temp:  [97 °F (36.1 °C)-98.2 °F (36.8 °C)] 98 °F (36.7 °C)  Heart Rate:  [63-89] 65  Resp:  [18-27] 20  BP: (102-135)/(55-93) 122/61  Flow (L/min):  [60] 60   Body mass index is 27.09 kg/m².    Physical Exam  Physical Exam  Constitutional:       General: He is not in acute distress.     Appearance: He is ill-appearing. He is not diaphoretic.      Interventions: Nasal cannula in place.   HENT:      Mouth/Throat:      Mouth: Mucous membranes are moist.      Pharynx: Oropharynx is clear.   Eyes:      Conjunctiva/sclera: Conjunctivae normal.   Cardiovascular:       Rate and Rhythm: Normal rate and regular rhythm.      Pulses: Normal pulses.      Heart sounds: Normal heart sounds.   Pulmonary:      Breath sounds: Examination of the right-upper field reveals rhonchi. Examination of the left-upper field reveals rhonchi. Examination of the right-middle field reveals rhonchi. Examination of the left-middle field reveals decreased breath sounds. Examination of the right-lower field reveals rhonchi. Examination of the left-lower field reveals decreased breath sounds. Decreased breath sounds and rhonchi present.   Genitourinary:     Comments: Catheter in place.   Neurological:      Mental Status: He is alert.   Psychiatric:         Mood and Affect: Mood is depressed.         Scheduled Meds   albuterol sulfate HFA, 2 puff, Inhalation, 4x Daily - RT  vitamin C, 1,000 mg, Oral, Daily  atorvastatin, 20 mg, Oral, Nightly  azithromycin, 500 mg, Intravenous, Q24H  budesonide-formoterol, 2 puff, Inhalation, BID - RT  cefTRIAXone, 1 g, Intravenous, Q24H  dexamethasone, 6 mg, Oral, Daily   Or  dexamethasone, 6 mg, Intravenous, Daily  dextromethorphan polistirex ER, 60 mg, Oral, Q12H  dilTIAZem CD, 120 mg, Oral, Q24H  ipratropium, 2 puff, Inhalation, 4x Daily - RT  pantoprazole, 40 mg, Intravenous, Q AM  rivaroxaban, 20 mg, Oral, Daily  sertraline, 100 mg, Oral, Daily  sodium chloride, 10 mL, Intravenous, Q12H       PRN Meds   •  aluminum-magnesium hydroxide-simethicone  •  benzonatate  •  senna-docusate sodium **AND** polyethylene glycol **AND** bisacodyl **AND** bisacodyl  •  ibuprofen  •  LORazepam  •  melatonin  •  metoclopramide  •  ondansetron **OR** ondansetron  •  sodium chloride  •  [COMPLETED] Insert Peripheral IV **AND** sodium chloride  •  sodium chloride  •  sodium chloride      Diagnostic Data    Results from last 7 days   Lab Units 03/02/23  0551 02/28/23  0421 02/27/23  0850   WBC 10*3/mm3 9.71   < > 12.06*   HEMOGLOBIN g/dL 13.1   < > 14.1   HEMATOCRIT % 39.0   < > 41.2    PLATELETS 10*3/mm3 253   < > 242   GLUCOSE mg/dL 104*   < > 111*   CREATININE mg/dL 1.34*   < > 1.66*   BUN mg/dL 40*   < > 41*   SODIUM mmol/L 138   < > 134*   POTASSIUM mmol/L 4.8   < > 4.5   AST (SGOT) U/L  --   --  30   ALT (SGPT) U/L  --   --  30   ALK PHOS U/L  --   --  67   BILIRUBIN mg/dL  --   --  0.5   ANION GAP mmol/L 8.0   < > 10.0    < > = values in this interval not displayed.       No radiology results for the last day    I reviewed the patient's new clinical results.    Assessment/Plan:     Active and Resolved Problems  Active Hospital Problems    Diagnosis  POA   • **Pneumonia due to COVID-19 virus [U07.1, J12.82]  Yes   • COVID-19 [U07.1]  Yes      Resolved Hospital Problems   No resolved problems to display.     1. Acute hypoxic respiratory failure: Likely secondary to COVID-19 possibly secondary Continue with weaning oxygen as needed.      2. COVID-19 infection: Continue decadron, bronchodilators, inhaled steroids, and anticoagulation. No growth on blood culture for 3 days, WBC wnls, and procalcitonin WNL today. Consider getting atypical panel to rule out and de-escalate antibiotics.     3. 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    4. History of atrial fibrillation: Continue with Cardizem 120 mg daily with Xarelto for DVT prophylaxis.     5. Depression: Continue with max dose of Zoloft 100 mg.     6. DVT prophylaxis: Continue with Xarelto    DVT prophylaxis:  Medical DVT prophylaxis orders are present.     Code status is   Code Status and Medical Interventions:   Ordered at: 02/23/23 4995     Medical Intervention Limits:    NO intubation (DNI)     Level Of Support Discussed With:    Patient     Code Status (Patient has no pulse and is not breathing):    CPR (Attempt to Resuscitate)     Medical Interventions (Patient has pulse or is breathing):    Limited Support       Plan for disposition:Where: SNF discussed with Case Management to  get into same nursing home as wife once Oxygen demands improve.     Time: 30 mins          This document has been electronically signed by Austin Ernandez MD on March 2, 2023 10:10 CST

## 2023-03-02 NOTE — PROGRESS NOTES
Son Hughes Case is a 72 y.o. male who qualifies for IV to PO therapy conversion.    Pantoprazole has been changed from IV to PO per System Policy.       Veena Hernandez, PharmD

## 2023-03-03 PROBLEM — D89.834 CYTOKINE RELEASE SYNDROME, GRADE 4: Status: ACTIVE | Noted: 2023-01-01

## 2023-03-03 PROBLEM — E43 SEVERE MALNUTRITION: Status: ACTIVE | Noted: 2023-01-01

## 2023-03-03 NOTE — PROGRESS NOTES
Enter Query Response Below      Query Response:   71 y/o male admitted with acute hypoxic respiratory failure. Per dietician consult on 3/1, Pt. documented to meet criteria for severe acute malnutrition as evidenced by severe insufficient energy intake and severe unintentional weight loss greater than 2% in one week. Boost plus supplement added to all meals to optimize intake. Dietician to follow.         After further study, can the Pt's condition be specified as:     Severe acute malnutrition          This document has been electronically signed by Austin Ernandez MD on March 3, 2023 11:33 CST      If applicable, please update the problem list.

## 2023-03-03 NOTE — DISCHARGE PLACEMENT REQUEST
"Ross De León (72 y.o. Male)     Date of Birth   1950    Social Security Number       Address   [address] [place] [id number]    Home Phone   [phone]    MRN   [id number]       [facility]    Marital Status                               Admission Date   2/23/23    Admission Type   Emergency    Admitting Provider   Alpesh Larkin MD    Attending Provider   Alpesh Larkin MD    Department, Room/Bed   [facility] CRITICAL CARE STEPDOWN, 06/A       Discharge Date       Discharge Disposition       Discharge Destination                               Attending Provider: Alpesh Larkin MD    Allergies: Contrast Dye (Echo Or Unknown Ct/mr), Morphine    Isolation: Enh Drop/Con   Infection: COVID (confirmed) (02/25/23)   Code Status: CPR    Ht: 188 cm (74\")   Wt: 93.4 kg (206 lb)    Admission Cmt: None   Principal Problem: Pneumonia due to COVID-19 virus [U07.1,J12.82]                 Active Insurance as of 2/23/2023     Primary Coverage     Payor Plan Insurance Group Employer/Plan Group    MEDICARE MEDICARE A & B      Payor Plan Address Payor Plan Phone Number Payor Plan Fax Number Effective Dates    [address] [phone]  4/1/2013 - None Entered    [facility] [address]       Subscriber Name Subscriber Birth Date Member ID       ROSS DE LEÓN 1950 [id number]           Secondary Coverage     Payor Plan Insurance Group Employer/Plan Group    [employer]CIAL [employer]  SUP        [id number]     Payor Plan Address Payor Plan Phone Number Payor Plan Fax Number Effective Dates    [address] [address]   1/1/2021 - None Entered    [facility] [address]       Subscriber Name Subscriber Birth Date Member ID       ROSS DE LEÓN 1950 [id number]                 Emergency Contacts      (Rel.) Home Phone Work Phone Mobile Phone    WILMER DE LEÓN (Spouse) [phone] -- [phone]    RonanShefali (Daughter) " -- -- [phone]

## 2023-03-03 NOTE — PLAN OF CARE
Goal Outcome Evaluation:  Plan of Care Reviewed With: daughter           Outcome Evaluation: VSS, Airvo maxed and patient refused being placed on CPAP today. Encouraged coughing and proning, this improved patients o2 saturations. Patient refused meals today and states he isnt hungry. Daughter called and is going to bring outside food for the patient.

## 2023-03-03 NOTE — PROGRESS NOTES
Adult Nutrition  Assessment    Patient Name:  Son Hughes Case  YOB: 1950  MRN: [id number]  Admit Date:  2/23/2023    Assessment Date:  3/3/2023    Comments:  Pt remains in ICU for treatment of +COVID 19 w/ hx COPD. Continues back and forth on AirVo to bipap and is proning himself. Cardiac diet w/ very few intakes available. Boost Plus all meals to optimize intakes. Intakes available show 1 meal 25%, 1 snack and 1 supplement 100%. Family has provided a bag of snacks as his food preferences are extremely limited. 2/23/23 wt 220# and 2/27 216#, 3/1 211#- #, noting cointinued wt loss. Pt met criteria for severe acute malnutrition 3/1, see RD/MSA note. Also noted last +BM 2/24 all bowel meds seem to be prn. BUN/Cr remain elevated. Will continue to monitor if renal labs improve to possibly d/c cardiac restrictions to optimize intake. RD following             Estimated/Assessed Needs - Anthropometrics     Row Name 03/03/23 0500          Anthropometrics    Weight 93.4 kg (206 lb)                       Electronically signed by:  Lydia Demarco RD  03/03/23 09:33 CST

## 2023-03-03 NOTE — PROGRESS NOTES
CRITICAL CARE DAILY PROGRESS NOTE  Family Medicine Residency Service  NAME: Son Hughes Case  : 1950  MRN: [id number]      LOS: 6 days     PROVIDER OF SERVICE: Austin Ernandez MD    Chief Complaint: Pneumonia due to COVID-19 virus    Subjective:     Interval History: History provided by: patient RN  Patient Complaints: Abdominal pain, poor appetite and constipation  Patient Denies: Any pain. Per nurse, still requires 100% CPAP overnight. He is sounding better and participated in self-proning.     As of today, 3/3/2023  Review of Systems:   Review of Systems   Constitutional: Positive for activity change and appetite change. Negative for chills and fever.   HENT: Negative for sore throat and trouble swallowing.    Respiratory: Positive for cough and shortness of breath.    Gastrointestinal: Positive for abdominal pain and constipation.   Genitourinary: Negative for dysuria.   Skin: Negative for wound.   Neurological: Positive for weakness.   Hematological: Does not bruise/bleed easily.   Psychiatric/Behavioral: Positive for dysphoric mood and sleep disturbance.       Objective:     Vital Signs  Temp:  [97 °F (36.1 °C)-98.6 °F (37 °C)] 97.9 °F (36.6 °C)  Heart Rate:  [60-84] 72  Resp:  [16-25] 25  BP: (100-138)/(55-92) 118/92  Body mass index is 26.45 kg/m².         I/O last 3 completed shifts:  In: -   Out: 1800 [Urine:1800]    Physical Exam  Physical Exam  Vitals and nursing note reviewed.   Constitutional:       General: He is not in acute distress.     Appearance: He is ill-appearing. He is not diaphoretic.      Comments: Sleepier and less cooperative with forthcoming with questions today. Reported no abdominal pain but cringes and shows discomfort with pressing anywhere on his abdomen.    Eyes:      Conjunctiva/sclera: Conjunctivae normal.   Cardiovascular:      Rate and Rhythm: Normal rate.      Pulses: Normal pulses.           Radial pulses are 2+ on the right side and 2+ on the left side.       Heart sounds: Normal heart sounds.   Pulmonary:      Effort: Tachypnea and accessory muscle usage present. No retractions.      Breath sounds: Examination of the right-upper field reveals rhonchi. Examination of the left-upper field reveals rhonchi. Examination of the right-middle field reveals rhonchi. Examination of the left-middle field reveals rhonchi. Examination of the right-lower field reveals rhonchi. Examination of the left-lower field reveals rhonchi. Rhonchi present. No decreased breath sounds, wheezing or rales.   Abdominal:      General: There is distension.      Tenderness: There is abdominal tenderness. There is guarding. There is no rebound.   Musculoskeletal:      Right lower leg: No edema.      Left lower leg: No edema.   Skin:     General: Skin is warm and dry.   Psychiatric:         Attention and Perception: Attention normal.         Mood and Affect: Affect normal. Mood is not depressed.         Speech: Speech normal.         Behavior: Behavior is cooperative.         Medication Review  albuterol sulfate HFA, 2 puff, Inhalation, 4x Daily - RT  vitamin C, 1,000 mg, Oral, Daily  atorvastatin, 20 mg, Oral, Nightly  azithromycin, 500 mg, Intravenous, Q24H  budesonide-formoterol, 2 puff, Inhalation, BID - RT  cefTRIAXone, 1 g, Intravenous, Q24H  dexamethasone, 6 mg, Oral, Daily   Or  dexamethasone, 6 mg, Intravenous, Daily  dextromethorphan polistirex ER, 60 mg, Oral, Q12H  dilTIAZem CD, 120 mg, Oral, Q24H  ipratropium, 2 puff, Inhalation, 4x Daily - RT  pantoprazole, 40 mg, Oral, Q AM  rivaroxaban, 20 mg, Oral, Daily  sertraline, 100 mg, Oral, Daily  sodium chloride, 10 mL, Intravenous, Q12H            Diagnostic Data    Results Review:    Results from last 7 days   Lab Units 03/03/23  0334 03/02/23  0551 03/01/23  0333 02/28/23  0421 02/27/23  0850 02/26/23  0650 02/25/23  0542   SODIUM mmol/L 134* 138 135*   < > 134* 136 135*   POTASSIUM mmol/L 4.5 4.8 4.6   < > 4.5 5.1 4.2   CHLORIDE mmol/L  103 106 104   < > 102 104 102   CO2 mmol/L 22.0 24.0 22.0   < > 22.0 24.0 22.0   BUN mg/dL 39* 40* 38*   < > 41* 40* 36*   CREATININE mg/dL 1.36* 1.34* 1.35*   < > 1.66* 1.64* 1.57*   CALCIUM mg/dL 8.9 8.6 8.6   < > 8.7 8.7 8.6   BILIRUBIN mg/dL  --   --   --   --  0.5 0.4 0.3   ALK PHOS U/L  --   --   --   --  67 68 79   ALT (SGPT) U/L  --   --   --   --  30 26 32   AST (SGOT) U/L  --   --   --   --  30 26 38   GLUCOSE mg/dL 114* 104* 125*   < > 111* 114* 112*    < > = values in this interval not displayed.       Estimated Creatinine Clearance: 63 mL/min (A) (by C-G formula based on SCr of 1.36 mg/dL (H)).                Results from last 7 days   Lab Units 03/03/23  0334 03/02/23  0551 03/01/23  0333 02/28/23  0421 02/27/23  0850   WBC 10*3/mm3 10.69 9.71 9.39 10.32 12.06*   HEMOGLOBIN g/dL 13.2 13.1 12.9* 12.9* 14.1   PLATELETS 10*3/mm3 238 253 224 197 242               Imaging Results (Last 24 Hours)     ** No results found for the last 24 hours. **          I reviewed the patient's new clinical results.    Assessment/Plan:     Active and Resolved Problems  Active Hospital Problems    Diagnosis  POA   • **Pneumonia due to COVID-19 virus [U07.1, J12.82]  Yes   • COVID-19 [U07.1]  Yes      Resolved Hospital Problems   No resolved problems to display.     1. Acute hypoxic respiratory failure: Likely secondary to COVID-19.  Continue with weaning oxygen as needed.       2. COVID-19 infection: Continue decadron, bronchodilators, inhaled steroids, and anticoagulation. No growth on blood culture, WBC wnls, and procalcitonin WNL today. Consider getting atypical panel to rule out and de-escalate antibiotics. Spoke with pharmacy, as patient has been on sufficient duration of rocephin [5  Days] and azithromycin [5 days], he has completed therapy and does not require further treatment. Procalcitonin is within normal, will discontinue trending it.      3. 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 OPEP ordered to help clear phlegm.      4. History of atrial fibrillation: Continue with Cardizem 120 mg daily with Xarelto for DVT prophylaxis.      5. Depression: Continue with max dose of Zoloft 100 mg. As patient's depression is not well controlled under his hospitalized situation, consider starting on Mirtazapine for benefits to mood and poor appetite.      6. Abdominal pain and distension: Likely due to constipation as patient has not had BM since 2/24/2023. Already has bowel regimen but has not received will notify nurse.      7. Poor appetite: As patient is more despondent and having poor appetite, start Mirtazapine 15 mg nightly.     8. DVT prophylaxis: Continue with Xarelto    DVT prophylaxis:  Medical DVT prophylaxis orders are present.     Code status is   Code Status and Medical Interventions:   Ordered at: 02/23/23 1847     Medical Intervention Limits:    NO intubation (DNI)     Level Of Support Discussed With:    Patient     Code Status (Patient has no pulse and is not breathing):    CPR (Attempt to Resuscitate)     Medical Interventions (Patient has pulse or is breathing):    Limited Support     Prognosis: fair  Plan for disposition: Where: nursing home with wife if available once Oxygen demands improve. Will be out of precautions tomorrow, 3/4/23    Time: 30 mins          This document has been electronically signed by Austin Ernandez MD on March 3, 2023 09:02 CST

## 2023-03-03 NOTE — PLAN OF CARE
Goal Outcome Evaluation: Pt on CPAP with 100% O2 most of the night, O2 above 90%. voids adequately, VSS. Pt self proning and self administer IS hourly while awake.

## 2023-03-03 NOTE — DISCHARGE SUMMARY
"    [facility]  DISCHARGE SUMMARY       Date of Admission: 2/23/2023  Date of Discharge:  3/3/2023  Primary Care Physician: Ethan Harris MD    Presenting Problem/History of Present Illness:  Pneumonia due to COVID-19 virus [U07.1, J12.82]  COVID-19 [U07.1]  Acute hypoxemic respiratory failure due to COVID-19 (HCC) [U07.1, J96.01]     Final Discharge Diagnoses:  Active Hospital Problems    Diagnosis    • **Pneumonia due to COVID-19 virus    • Severe malnutrition (HCC)    • Cytokine release syndrome, grade 4    • COVID-19        Consults:   Consults     No orders found from 1/25/2023 to 2/24/2023.        Pertinent Test Results:   Lab Results (most recent)     Procedure Component Value Units Date/Time    Blood Culture - Blood, Wrist, Left [[id number]]  (Normal) Collected: 02/27/23 1016    Specimen: Blood from Wrist, Left Updated: 03/03/23 1030     Blood Culture No growth at 4 days    Blood Culture - Blood, Arm, Right [[id number]]  (Normal) Collected: 02/27/23 1016    Specimen: Blood from Arm, Right Updated: 03/03/23 1030     Blood Culture No growth at 4 days    Procalcitonin [[id number]]  (Normal) Collected: 03/03/23 0334    Specimen: Blood Updated: 03/03/23 0416     Procalcitonin 0.15 ng/mL     Narrative:      As a Marker for Sepsis (Non-Neonates):    1. <0.5 ng/mL represents a low risk of severe sepsis and/or septic shock.  2. >2 ng/mL represents a high risk of severe sepsis and/or septic shock.    As a Marker for Lower Respiratory Tract Infections that require antibiotic therapy:    PCT on Admission    Antibiotic Therapy       6-12 Hrs later    >0.5                Strongly Recommended  >0.25 - <0.5        Recommended   0.1 - 0.25          Discouraged              Remeasure/reassess PCT  <0.1                Strongly Discouraged     Remeasure/reassess PCT    As 28 day mortality risk marker: \"Change in Procalcitonin Result\" (>80% or <=80%) if Day 0 (or " Day 1) and Day 4 values are available. Refer to [email or web address]    Change in PCT <=80%  A decrease of PCT levels below or equal to 80% defines a positive change in PCT test result representing a higher risk for 28-day all-cause mortality of patients diagnosed with severe sepsis for septic shock.    Change in PCT >80%  A decrease of PCT levels of more than 80% defines a negative change in PCT result representing a lower risk for 28-day all-cause mortality of patients diagnosed with severe sepsis or septic shock.       Basic Metabolic Panel [[id number]]  (Abnormal) Collected: 03/03/23 0334    Specimen: Blood Updated: 03/03/23 0411     Glucose 114 mg/dL      BUN 39 mg/dL      Creatinine 1.36 mg/dL      Sodium 134 mmol/L      Potassium 4.5 mmol/L      Chloride 103 mmol/L      CO2 22.0 mmol/L      Calcium 8.9 mg/dL      BUN/Creatinine Ratio 28.7     Anion Gap 9.0 mmol/L      eGFR 54.6 mL/min/1.73     Narrative:      GFR Normal >60  Chronic Kidney Disease <60  Kidney Failure <15    The GFR formula is only valid for adults with stable renal function between ages 18 and 70.    C-reactive Protein [[id number]]  (Abnormal) Collected: 03/03/23 0334    Specimen: Blood Updated: 03/03/23 0411     C-Reactive Protein 2.51 mg/dL     CBC & Differential [[id number]]  (Abnormal) Collected: 03/03/23 0334    Specimen: Blood Updated: 03/03/23 0345    Narrative:      The following orders were created for panel order CBC & Differential.  Procedure                               Abnormality         Status                     ---------                               -----------         ------                     CBC Auto Differential[[id number]]        Abnormal            Final result                 Please view results for these tests on the individual orders.    CBC Auto Differential [[id number]]  (Abnormal) Collected: 03/03/23 0334    Specimen: Blood Updated: 03/03/23 0345     WBC 10.69 10*3/mm3      RBC 4.40 10*6/mm3       "Hemoglobin 13.2 g/dL      Hematocrit 38.7 %      MCV 88.0 fL      MCH 30.0 pg      MCHC 34.1 g/dL      RDW 13.2 %      RDW-SD 42.5 fl      MPV 8.7 fL      Platelets 238 10*3/mm3      Neutrophil % 88.9 %      Lymphocyte % 6.3 %      Monocyte % 3.5 %      Eosinophil % 0.0 %      Basophil % 0.2 %      Immature Grans % 1.1 %      Neutrophils, Absolute 9.51 10*3/mm3      Lymphocytes, Absolute 0.67 10*3/mm3      Monocytes, Absolute 0.37 10*3/mm3      Eosinophils, Absolute 0.00 10*3/mm3      Basophils, Absolute 0.02 10*3/mm3      Immature Grans, Absolute 0.12 10*3/mm3      nRBC 0.0 /100 WBC     Procalcitonin [[id number]]  (Normal) Collected: 03/02/23 0551    Specimen: Blood Updated: 03/02/23 0708     Procalcitonin 0.21 ng/mL     Narrative:      As a Marker for Sepsis (Non-Neonates):    1. <0.5 ng/mL represents a low risk of severe sepsis and/or septic shock.  2. >2 ng/mL represents a high risk of severe sepsis and/or septic shock.    As a Marker for Lower Respiratory Tract Infections that require antibiotic therapy:    PCT on Admission    Antibiotic Therapy       6-12 Hrs later    >0.5                Strongly Recommended  >0.25 - <0.5        Recommended   0.1 - 0.25          Discouraged              Remeasure/reassess PCT  <0.1                Strongly Discouraged     Remeasure/reassess PCT    As 28 day mortality risk marker: \"Change in Procalcitonin Result\" (>80% or <=80%) if Day 0 (or Day 1) and Day 4 values are available. Refer to [email or web address]    Change in PCT <=80%  A decrease of PCT levels below or equal to 80% defines a positive change in PCT test result representing a higher risk for 28-day all-cause mortality of patients diagnosed with severe sepsis for septic shock.    Change in PCT >80%  A decrease of PCT levels of more than 80% defines a negative change in PCT result representing a lower risk for 28-day all-cause mortality of patients diagnosed with severe sepsis or septic shock.       " Basic Metabolic Panel [[id number]]  (Abnormal) Collected: 03/02/23 0551    Specimen: Blood Updated: 03/02/23 0659     Glucose 104 mg/dL      BUN 40 mg/dL      Creatinine 1.34 mg/dL      Sodium 138 mmol/L      Potassium 4.8 mmol/L      Chloride 106 mmol/L      CO2 24.0 mmol/L      Calcium 8.6 mg/dL      BUN/Creatinine Ratio 29.9     Anion Gap 8.0 mmol/L      eGFR 55.6 mL/min/1.73     Narrative:      GFR Normal >60  Chronic Kidney Disease <60  Kidney Failure <15    The GFR formula is only valid for adults with stable renal function between ages 18 and 70.    C-reactive Protein [[id number]]  (Abnormal) Collected: 03/02/23 0551    Specimen: Blood Updated: 03/02/23 0659     C-Reactive Protein 4.63 mg/dL     CBC & Differential [[id number]]  (Abnormal) Collected: 03/02/23 0551    Specimen: Blood Updated: 03/02/23 0635    Narrative:      The following orders were created for panel order CBC & Differential.  Procedure                               Abnormality         Status                     ---------                               -----------         ------                     CBC Auto Differential[[id number]]        Abnormal            Final result                 Please view results for these tests on the individual orders.    CBC Auto Differential [[id number]]  (Abnormal) Collected: 03/02/23 0551    Specimen: Blood Updated: 03/02/23 0635     WBC 9.71 10*3/mm3      RBC 4.43 10*6/mm3      Hemoglobin 13.1 g/dL      Hematocrit 39.0 %      MCV 88.0 fL      MCH 29.6 pg      MCHC 33.6 g/dL      RDW 13.2 %      RDW-SD 42.7 fl      MPV 8.9 fL      Platelets 253 10*3/mm3      Neutrophil % 87.4 %      Lymphocyte % 6.9 %      Monocyte % 4.3 %      Eosinophil % 0.0 %      Basophil % 0.2 %      Immature Grans % 1.2 %      Neutrophils, Absolute 8.48 10*3/mm3      Lymphocytes, Absolute 0.67 10*3/mm3      Monocytes, Absolute 0.42 10*3/mm3      Eosinophils, Absolute 0.00 10*3/mm3      Basophils, Absolute 0.02 10*3/mm3      Immature Grans,  Absolute 0.12 10*3/mm3      nRBC 0.0 /100 WBC     Lactate Dehydrogenase [[id number]]  (Abnormal) Collected: 02/28/23 0421    Specimen: Blood Updated: 02/28/23 0532      U/L     Extra Tubes [[id number]] Collected: 02/28/23 0421    Specimen: Blood Updated: 02/28/23 0530    Narrative:      The following orders were created for panel order Extra Tubes.  Procedure                               Abnormality         Status                     ---------                               -----------         ------                     Lavender Top[[id number]]                                     Final result                 Please view results for these tests on the individual orders.    Lavender Top [[id number]] Collected: 02/28/23 0421    Specimen: Blood Updated: 02/28/23 0530     Extra Tube hold for add-on     Comment: Auto resulted       Blood Gas, Arterial - [[id number]]  (Abnormal) Collected: 02/27/23 1532    Specimen: Arterial Blood Updated: 02/27/23 1531     Site Right Radial     Obi's Test N/A     pH, Arterial 7.404 pH units      pCO2, Arterial 33.6 mm Hg      Comment: 84 Value below reference range        pO2, Arterial 74.7 mm Hg      Comment: 84 Value below reference range        HCO3, Arterial 21.0 mmol/L      Base Excess, Arterial -2.9 mmol/L      Comment: 84 Value below reference range        O2 Saturation, Arterial 95.6 %      Barometric Pressure for Blood Gas 732 mmHg      Modality CPAP     FIO2 65 %      Ventilator Mode NA     CPAP 13.0 cmH2O      Comment: Meter: [id number]     :  [id number]        Collected by     Comprehensive Metabolic Panel [[id number]]  (Abnormal) Collected: 02/27/23 0850    Specimen: Blood Updated: 02/27/23 1207     Glucose 111 mg/dL      BUN 41 mg/dL      Creatinine 1.66 mg/dL      Sodium 134 mmol/L      Potassium 4.5 mmol/L      Chloride 102 mmol/L      CO2 22.0 mmol/L      Calcium 8.7 mg/dL      Total Protein 6.5 g/dL      Albumin 3.3 g/dL      ALT (SGPT) 30 U/L      AST  (SGOT) 30 U/L      Alkaline Phosphatase 67 U/L      Total Bilirubin 0.5 mg/dL      Globulin 3.2 gm/dL      A/G Ratio 1.0 g/dL      BUN/Creatinine Ratio 24.7     Anion Gap 10.0 mmol/L      eGFR 43.0 mL/min/1.73     Narrative:      GFR Normal >60  Chronic Kidney Disease <60  Kidney Failure <15    The GFR formula is only valid for adults with stable renal function between ages 18 and 70.    Extra Tubes [[id number]] Collected: 02/27/23 0850    Specimen: Blood Updated: 02/27/23 1001    Narrative:      The following orders were created for panel order Extra Tubes.  Procedure                               Abnormality         Status                     ---------                               -----------         ------                     Lavender Top[[id number]]                                     Final result               Green Top (Gel)[[id number]]                                  Final result                 Please view results for these tests on the individual orders.    Lavender Top [[id number]] Collected: 02/27/23 0850    Specimen: Blood Updated: 02/27/23 1001     Extra Tube hold for add-on     Comment: Auto resulted       Green Top (Gel) [[id number]] Collected: 02/27/23 0850    Specimen: Blood Updated: 02/27/23 1001     Extra Tube Hold for add-ons.     Comment: Auto resulted.       Blood Gas, Arterial - [[id number]]  (Abnormal) Collected: 02/27/23 0950    Specimen: Arterial Blood Updated: 02/27/23 0950     Site Left Radial     Obi's Test N/A     pH, Arterial 7.449 pH units      pCO2, Arterial 29.0 mm Hg      Comment: 84 Value below reference range        pO2, Arterial 51.6 mm Hg      Comment: 85 Value below critical limit        HCO3, Arterial 20.1 mmol/L      Base Excess, Arterial -2.6 mmol/L      Comment: 84 Value below reference range        O2 Saturation, Arterial 89.2 %      Comment: 84 Value below reference range        Barometric Pressure for Blood Gas 732 mmHg      Modality NRB     Flow Rate 15.0 lpm       Ventilator Mode NA     Collected by      Comment: Meter: [id number]     :  [id number]       Lactic Acid, Plasma [[id number]]  (Normal) Collected: 02/27/23 0850    Specimen: Blood Updated: 02/27/23 0933     Lactate 1.5 mmol/L     Comprehensive Metabolic Panel [[id number]]  (Abnormal) Collected: 02/26/23 0650    Specimen: Blood Updated: 02/26/23 0723     Glucose 114 mg/dL      BUN 40 mg/dL      Creatinine 1.64 mg/dL      Sodium 136 mmol/L      Potassium 5.1 mmol/L      Chloride 104 mmol/L      CO2 24.0 mmol/L      Calcium 8.7 mg/dL      Total Protein 6.2 g/dL      Albumin 3.2 g/dL      ALT (SGPT) 26 U/L      AST (SGOT) 26 U/L      Alkaline Phosphatase 68 U/L      Total Bilirubin 0.4 mg/dL      Globulin 3.0 gm/dL      A/G Ratio 1.1 g/dL      BUN/Creatinine Ratio 24.4     Anion Gap 8.0 mmol/L      eGFR 43.6 mL/min/1.73     Narrative:      GFR Normal >60  Chronic Kidney Disease <60  Kidney Failure <15    The GFR formula is only valid for adults with stable renal function between ages 18 and 70.    Lactate Dehydrogenase [[id number]]  (Normal) Collected: 02/26/23 0650    Specimen: Blood Updated: 02/26/23 0723      U/L     D-dimer, Quantitative [[id number]]  (Abnormal) Collected: 02/23/23 1612    Specimen: Blood Updated: 02/23/23 2141     D-Dimer, Quantitative 970 ng/mL (FEU)     Narrative:      According to the assay 's published package insert, a normal (<500 ng/mL (FEU)) D-dimer result in conjunction with a non-high clinical probability assessment, excludes deep vein thrombosis (DVT) and pulmonary embolism (PE) with high sensitivity.    D-dimer values increase with age and this can make VTE exclusion of an older population difficult. To address this, the [employer], based on best available evidence and recent guidelines, recommends that clinicians use age-adjusted D-dimer thresholds in patients greater than 50 years of age with: a) a low probability of PE who do not meet  "all Pulmonary Embolism Rule Out Criteria, or b) in those with intermediate probability of PE.   The formula for an age-adjusted D-dimer cut-off is \"age*10\".  For example, a 60 year old patient would have an age-adjusted cut-off of 600 ng/mL (FEU) and an 80 year old 800 ng/mL (FEU).      Gray Top [[id number]] Collected: 02/23/23 1613    Specimen: Blood Updated: 02/23/23 2015     Extra Tube Hold for add-ons.     Comment: Auto resulted.       Lactic Acid, Plasma [[id number]]  (Normal) Collected: 02/23/23 1613    Specimen: Blood Updated: 02/23/23 1850     Lactate 0.9 mmol/L     Urinalysis With Microscopic If Indicated (No Culture) - Urine, Clean Catch [[id number]]  (Abnormal) Collected: 02/23/23 1754    Specimen: Urine, Clean Catch Updated: 02/23/23 1759     Color, UA Yellow     Appearance, UA Clear     pH, UA <=5.0     Specific Gravity, UA 1.021     Glucose, UA Negative     Ketones, UA Trace     Bilirubin, UA Negative     Blood, UA Negative     Protein, UA Trace     Leuk Esterase, UA Negative     Nitrite, UA Negative     Urobilinogen, UA 1.0 E.U./dL    Narrative:      Urine microscopic not indicated.    [place] Draw [[id number]] Collected: 02/23/23 1612    Specimen: Blood Updated: 02/23/23 1715    Narrative:      The following orders were created for panel order [place] Draw.  Procedure                               Abnormality         Status                     ---------                               -----------         ------                     Green Top (Gel)[[id number]]                                  Final result               Lavender Top[[id number]]                                     Final result               Gold Top - SST[[id number]]                                   Final result               Light Blue Top[[id number]]                                   Final result                 Please view results for these tests on the individual orders.    Green Top (Gel) [[id number]] Collected: 02/23/23 1612    Specimen: " Blood Updated: 02/23/23 1715     Extra Tube Hold for add-ons.     Comment: Auto resulted.       Gold Top - SST [[id number]] Collected: 02/23/23 1612    Specimen: Blood Updated: 02/23/23 1715     Extra Tube Hold for add-ons.     Comment: Auto resulted.       Light Blue Top [[id number]] Collected: 02/23/23 1612    Specimen: Blood Updated: 02/23/23 1715     Extra Tube Hold for add-ons.     Comment: Auto resulted       Single High Sensitivity Troponin T [[id number]]  (Normal) Collected: 02/23/23 1612    Specimen: Blood Updated: 02/23/23 1639     HS Troponin T 12 ng/L     Narrative:      High Sensitive Troponin T Reference Range:  <10.0 ng/L- Negative Female for AMI  <15.0 ng/L- Negative Male for AMI  >=10 - Abnormal Female indicating possible myocardial injury.  >=15 - Abnormal Male indicating possible myocardial injury.   Clinicians would have to utilize clinical acumen, EKG, Troponin, and serial changes to determine if it is an Acute Myocardial Infarction or myocardial injury due to an underlying chronic condition.         BNP [[id number]]  (Normal) Collected: 02/23/23 1612    Specimen: Blood Updated: 02/23/23 1636     proBNP 163.9 pg/mL     Narrative:      Among patients with dyspnea, NT-proBNP is highly sensitive for the detection of acute congestive heart failure. In addition NT-proBNP of <300 pg/ml effectively rules out acute congestive heart failure with 99% negative predictive value.          Imaging Results (Most Recent)     Procedure Component Value Units Date/Time    XR Chest 1 View [[id number]] Collected: 02/27/23 0817     Updated: 02/27/23 0849    Narrative:      EXAM: XR CHEST 1 VIEW     HISTORY: worsening hypoxia, U07.1 COVID-19 Z74.09 Other reduced  mobility.    COMPARISON: February 23    FINDINGS:    Heart: Normal size.     Mediastinum: Normal contours.    Lungs: Low lung volumes. Diffuse bilateral peripheral subpleural  groundglass, reticulation and reticulonodular opacities are  unchanged. No significant  pleural effusion. No pneumothorax.    Bones: Degenerative changes in the thoracic spine.    Abdomen: Visualized upper abdomen is unremarkable      Impression:        No significant interval change    Electronically signed by:  Ramon Gomez DO  2/27/2023 8:46 AM  [facility] Workstation: [id number]    CT Chest Without Contrast Diagnostic [[id number]] Collected: 02/23/23 1904     Updated: 02/23/23 2001    Narrative:      EXAMINATION(S):  CT CHEST WITHOUT IV CONTRAST    COMPARISON: Portable chest x-ray February 23, 2023. Cardiac CT  September 28, 2021    INDICATION: SOB, abnormal CXR, U07.1 COVID-19 J12.82 Pneumonia  due to coronavirus disease 2019    FINDING(S):  Routine thin section transaxial tomograms are  obtained from the supraclavicular region through the adrenal  glands without contrast. Supplemental coronal and sagittal  imaging is also created and reviewed. All CT scans at this  facility use dose modulation, iterative reconstruction and/or  weight based dosing when appropriate to reduce radiation dose to  as low as reasonably achievable.    Thoracic aorta: Mild thoracic aortic atherosclerosis is present.  No thoracic aortic aneurysm is evident.  Heart: The heart size appears normal. Coronary artery  calcification is present. No pericardial effusion.  Mediastinum and hilum: No mediastinal or hilar mass or pathologic  lymph node enlargement is evident. Small left hilar, perihilar  and left lower lobe calcified granulomas are benign appearing.  Pulmonary:The trachea and major central airways appear patent.  Moderate to severe upper lobe predominant emphysema is present.  Scattered interstitial opacities are noted corresponding to the  x-ray findings. Some of these are more concentrated in the  posterior right upper lobe, base of the right upper lobe in the  periphery of the lower lobes. These may simply represent chronic  interstitial fibrotic densities related to the underlying COPD.  Some are associated with punctate  calcified granulomas. They  could also represent acute or chronic inflammatory changes  related to previous granulomatous infection, infectious  bronchiolitis or even chronic post Covid 19 changes. The pattern  is considered low probability for active Covid 19 pneumonia. No  lobar consolidation, pleural effusion, pneumothorax or pulmonary  edema is evident. No suspicious pulmonary or pleural nodules are  seen.  Bones:No acute bony pathology is evident.  Subdiaphragmatic: No gross acute subdiaphragmatic pathology is  evident.  Body wall and axilla: No acute body wall pathology.      Impression:      Emphysema with scattered interstitial opacities in  keeping with probable chronic fibrosis or post infectious nodules  as described. The pattern is considered low probability for  active Covid 19 pneumonia.    Electronically signed by:  Oleksandr Baldwin MD  2/23/2023 7:58 PM CST  Workstation: [id number]    XR Chest 1 View [[id number]] Collected: 02/23/23 1709     Updated: 02/23/23 1733    Narrative:          COMPARISON:     None    INDICATION:     Shortness of air    FINDINGS:     Portable AP chest radiograph is obtained. Cardiac  silhouette accentuated by low volumes. Mediastinum is normal  width. The pulmonary vasculature is within normal limits.  Low  lung volumes. Mild prominence of the interstitium with right mid  lung and basilar airspace opacities left effusion. No  pneumothorax.      Impression:      Right mid lung and bibasilar mixed interstitial and  alveolar opacities suggests atypical infection.    Electronically signed by:  Rogelio Silva MD  2/23/2023 5:31 PM CST  Workstation: [id number]          Chief Complaint on Day of Discharge: Shortness of breath    Hospital Course:  The patient is a 72 y.o. male who presented to [facility] with shortness of breath, cough, and fatigue.  He tested positive for COVID-19 at home and presented to the emergency department as his symptoms worsened.  Initially required  "2 L of oxygen per nasal cannula.  He was started on Decadron and bronchodilators.  Shortness of breath continue to worsen and his oxygen requirement increased.  He eventually required BiPAP.  He received maximum therapeutic intervention.  BiPAP was weaned to Airvo as patient tolerated.  He still required BiPAP overnight.  Appetite was poor and patient was fairly depressed due to his clinical situation.  He did have significant constipation and bowel regimen was ordered.  After discussion with patient and his daughter plan was made for transfer to LTAC and this was accomplished.  Patient transferred in hemodynamically stable condition on Airvo.    Condition on Discharge: Hemodynamically stable, continues to require Airvo    Physical Exam on Discharge:  /55   Pulse 75   Temp 97.4 °F (36.3 °C) (Axillary)   Resp 22   Ht 188 cm (74\")   Wt 93.4 kg (206 lb)   SpO2 (!) 86%   BMI 26.45 kg/m²      Physical Exam  Vitals reviewed.   Constitutional:       Appearance: He is well-developed.   HENT:      Head: Normocephalic and atraumatic.   Eyes:      Pupils: Pupils are equal, round, and reactive to light.   Cardiovascular:      Rate and Rhythm: Normal rate and regular rhythm.      Heart sounds: Normal heart sounds. No murmur heard.    No friction rub. No gallop.   Pulmonary:      Effort: Pulmonary effort is normal. Tachypnea present. No respiratory distress.      Breath sounds: Decreased breath sounds present. No wheezing or rales.   Chest:      Chest wall: No tenderness.   Abdominal:      General: Bowel sounds are normal. There is no distension.      Palpations: Abdomen is soft.      Tenderness: There is no abdominal tenderness.   Musculoskeletal:      Cervical back: Normal range of motion and neck supple.   Psychiatric:         Behavior: Behavior normal.           Discharge Disposition:  Long Term Care (DC - External)    Discharge Medications:     Discharge Medications      New Medications      Instructions Start " Date   albuterol sulfate  (90 Base) MCG/ACT inhaler  Commonly known as: PROVENTIL HFA;VENTOLIN HFA;PROAIR HFA   2 puffs, Inhalation, 4 Times Daily - RT      aluminum-magnesium hydroxide-simethicone 400-400-40 MG/5ML suspension  Commonly known as: MAALOX MAX   15 mL, Oral, Every 6 Hours PRN      benzonatate 100 MG capsule  Commonly known as: TESSALON   100 mg, Oral, 3 Times Daily PRN      bisacodyl 5 MG EC tablet  Commonly known as: DULCOLAX   5 mg, Oral, Daily PRN      bisacodyl 10 MG suppository  Commonly known as: DULCOLAX   10 mg, Rectal, Daily PRN      dexamethasone 4 MG/ML injection  Commonly known as: DECADRON   6 mg, Intravenous, Daily   Start Date: March 4, 2023     dextromethorphan polistirex ER 30 MG/5ML Suspension Extended Release oral suspension  Commonly known as: DELSYM   60 mg, Oral, Every 12 Hours Scheduled      ibuprofen 600 MG tablet  Commonly known as: ADVIL,MOTRIN   600 mg, Oral, Every 6 Hours PRN      ipratropium 17 MCG/ACT inhaler  Commonly known as: ATROVENT HFA   2 puffs, Inhalation, 4 Times Daily - RT      LORazepam 2 MG/ML injection  Commonly known as: ATIVAN   1 mg, Intravenous, Every 6 Hours PRN      melatonin 5 MG tablet tablet   5 mg, Oral, Nightly PRN      metoclopramide 5 MG/ML injection  Commonly known as: REGLAN   5 mg, Intravenous, Every 6 Hours PRN      mirtazapine 15 MG tablet  Commonly known as: REMERON   15 mg, Oral, Nightly      ondansetron 4 MG tablet  Commonly known as: ZOFRAN   4 mg, Oral, Every 6 Hours PRN      ondansetron 2 mg/mL injection  Commonly known as: ZOFRAN   4 mg, Intravenous, Every 6 Hours PRN      pantoprazole 40 MG EC tablet  Commonly known as: PROTONIX   40 mg, Oral, Every Early Morning   Start Date: March 4, 2023     polyethylene glycol 17 g packet  Commonly known as: MIRALAX   17 g, Oral, Daily PRN      sennosides-docusate 8.6-50 MG per tablet  Commonly known as: PERICOLACE   2 tablets, Oral, 2 Times Daily PRN      sodium chloride 0.9 % solution    40 mL, Intravenous, As Needed         Continue These Medications      Instructions Start Date   budesonide-formoterol 160-4.5 MCG/ACT inhaler  Commonly known as: SYMBICORT   2 puffs, Inhalation, 2 Times Daily - RT      dilTIAZem 120 MG 24 hr capsule  Commonly known as: TIAZAC   120 mg, Oral, Daily      rivaroxaban 20 MG tablet  Commonly known as: XARELTO   20 mg, Oral, Daily      sertraline 100 MG tablet  Commonly known as: ZOLOFT   Take 1 tablet by mouth once daily      simvastatin 80 MG tablet  Commonly known as: ZOCOR   40 mg, Oral, Nightly      vitamin C 500 MG tablet  Commonly known as: ASCORBIC ACID   1,000 mg, Oral, Daily         Stop These Medications    B-12 Dual Spectrum 5000 MCG tablet controlled-release  Generic drug: Cyanocobalamin ER     cholecalciferol 1.25 MG (67295 UT) capsule  Commonly known as: VITAMIN D3     fish oil 1000 MG capsule capsule     lisinopril 20 MG tablet  Commonly known as: PRINIVIL,ZESTRIL     [facility] capsule     tiotropium 18 MCG per inhalation capsule  Commonly known as: SPIRIVA            Discharge Diet:   Diet Instructions     Diet: Cardiac, Soft Texture; Thin Liquids, No Restrictions; Chopped      Discharge Diet:  Cardiac  Soft Texture       Fluid Consistency: Thin Liquids, No Restrictions    Soft Options: Chopped          Activity at Discharge:   Activity Instructions     Activity as Tolerated          Follow-up Appointments:   PCP in 1 to 2 weeks after discharge from [facility]    Test Results Pending at Discharge:   Pending Labs     Order Current Status    Blood Culture - Blood, Arm, Right Preliminary result    Blood Culture - Blood, Wrist, Left Preliminary result              This document has been electronically signed by Alpesh Larkin MD on March 3, 2023 16:47 CST      Time: 35 minutes

## 2023-03-04 NOTE — ACP (ADVANCE CARE PLANNING)
[facility] @ [facility]  INPATIENT PROGRESS NOTE    PATIENT NAME: Son Hughes Case      PHYSICIAN: Bhanu Tamayo MD  : [id number]        MRN: [id number]  Patient Care Team:  Ethan Harris MD as PCP - General (Family Medicine)    Chief Complaint: I had shortness of breath.  History of Present Illness: 72-year-old gentleman with significant history of hypertension, dyslipidemia, atrial fibrillation on chronic anticoagulation presented to the emergency at [facility] with complaint of an shortness of breath.  Patient was admitted in [facility] on 2023.  Patient was admitted for hypoxic respiratory failure with COVID-19 viral pneumonia.  Patient was started on steroids and nebulizer treatment.  Patient was given supplemental oxygenation.  Patient's condition did not improve as much as expected and patient remained in bed with high flow nasal cannula.  Patient was placed on Airvo which she had been tolerating.  Patient has been on long-term steroids as well.  Patient was subsequently recommended to be admitted to our facility for therapy and rehabilitation along with attempt to get patient off of high flow oxygen in interval.  Patient denies any complaints.  No chest pain or palpitation reported.  Patient does complain of exertional dyspnea.  No fever chills have been reported.  No headache blurry vision have been reported.  Patient said this has been going on for at least 10 days.  Each episode last for few hours.  CT scan done on  reviewed suggestive of emphysema with scattered interstitial opacities.  Patient did have COVID-19 viral pneumonia.  Blood work reviewed with ABG done on  suggest was Ph of 7.45 PCO2 of 29 PO2 of 52 on nonrebreather.    Assessment      Acute on chronic hypoxic respiratory failure.  Respiratory failure secondary COVID-19 viral pneumonia.  Cytokeratin release syndrome grade  4.  Hypertension.  Atrial fibrillation on chronic anticoagulation.  Dyslipidemia.  Chronic obstructive pulmonary disease with possibility of oxygen at home.  DVT and GI prophylaxis.        DVT Prophylaxis: Xarelto.  GI Prophylaxis: Protonix.  Code Status: DNI.    Plan     -Clinically worsening.  -Overnight events noted.  -Patient placed on BiPAP at 50% saturating at 98% currently broken family made aware of diagnosis and poor prognosis.  -Continue with IV steroids and nebulizer treatment.  -Continue with IV diuretics.  -We will continue with incentive spirometry.  -Patient's advanced directives-discussed with patient's son and daughter over the phone.  Approximately 20 minutes spent discussing advanced directives including CODE STATUS.  Family wished to discuss among themselves prior to making any further decision however currently he is DNI.  -DVT and GI prophylaxis in place.  -We'll continue monitoring patient in hospital setting and treat patient as course dictates.  -Please review orders for detailed plan of care.  -For Aute and Chronic medical condition we will continue medications described below in medication section which have been reviewed and we will continue unless changed in plan of care.  -Laboratory and diagnostic studies have been independently reviewed and the reports are reviewed as documented below.    Subjective   Interval History:   Patient Complaints:  Patient seen and examined.  Patient resting comfortably on BiPAP.  Overnight events noted.  History taken from: Review, nursing staff.    Review of Systems:    Review of Systems   Unable to perform ROS: Mental status change       Objective   Vital Signs  Temp: 97.4 F         Heart Rate: 92         Resp: 38          Blood Pressure: 127/69         Pulse Ox: 96 %    Physical Exam:   Physical Exam  Vitals and nursing note reviewed.   Constitutional:       General: He is in acute distress.      Appearance: He is well-developed. He is ill-appearing.    HENT:      Head: Normocephalic and atraumatic.      Nose: Nose normal.   Eyes:      Conjunctiva/sclera: Conjunctivae normal.      Pupils: Pupils are equal, round, and reactive to light.   Neck:      Thyroid: No thyromegaly.      Vascular: No JVD.      Trachea: No tracheal deviation.   Cardiovascular:      Rate and Rhythm: Regular rhythm. Tachycardia present.      Pulses: Normal pulses.      Heart sounds: Normal heart sounds.   Pulmonary:      Effort: Respiratory distress present.      Breath sounds: Rales present. No wheezing.   Chest:      Chest wall: No tenderness.   Abdominal:      General: Bowel sounds are normal. There is no distension.      Palpations: Abdomen is soft.      Tenderness: There is no abdominal tenderness. There is no guarding or rebound.   Musculoskeletal:         General: Normal range of motion.      Cervical back: Normal range of motion and neck supple.      Right lower leg: Edema present.      Left lower leg: Edema present.   Lymphadenopathy:      Cervical: No cervical adenopathy.   Skin:     General: Skin is warm and dry.      Capillary Refill: Capillary refill takes 2 to 3 seconds.      Comments: Intact   Neurological:      General: No focal deficit present.      Mental Status: He is alert. He is disoriented.      Cranial Nerves: No cranial nerve deficit.      Deep Tendon Reflexes: Reflexes are normal and symmetric.       Results Review:       Results from last 7 days   Lab Units 03/04/23  0635 03/03/23  0334 03/02/23  0551 03/01/23  0333 02/28/23  0421 02/27/23  0850 02/26/23  0650   SODIUM mmol/L 138 134* 138 135* 135* 134* 136   POTASSIUM mmol/L 4.7 4.5 4.8 4.6 4.7 4.5 5.1   CHLORIDE mmol/L 106 103 106 104 102 102 104   CO2 mmol/L 21.0* 22.0 24.0 22.0 25.0 22.0 24.0   BUN mg/dL 36* 39* 40* 38* 39* 41* 40*   CREATININE mg/dL 1.36* 1.36* 1.34* 1.35* 1.53* 1.66* 1.64*   GLUCOSE mg/dL 110* 114* 104* 125* 129* 111* 114*   CALCIUM mg/dL 8.9 8.9 8.6 8.6 8.6 8.7 8.7   BILIRUBIN mg/dL 0.6  --    --   --   --  0.5 0.4   ALK PHOS U/L 96  --   --   --   --  67 68   ALT (SGPT) U/L 103*  --   --   --   --  30 26   AST (SGOT) U/L 36  --   --   --   --  30 26     Results from last 7 days   Lab Units 03/04/23  0635   MAGNESIUM mg/dL 2.2     Results from last 7 days   Lab Units 03/04/23  0635 03/03/23  0334 03/02/23  0551 03/01/23  0333 02/28/23  0421 02/27/23  0850 02/26/23  0650   WBC 10*3/mm3 14.38* 10.69 9.71 9.39 10.32 12.06* 9.08   HEMOGLOBIN g/dL 13.7 13.2 13.1 12.9* 12.9* 14.1 14.0   HEMATOCRIT % 40.3 38.7 39.0 37.3* 38.8 41.2 40.1   PLATELETS 10*3/mm3 235 238 253 224 197 242 196     Lab Results   Component Value Date    TROPONINT 12 02/23/2023     pH, Arterial   Date Value Ref Range Status   03/04/2023 7.446 7.350 - 7.450 pH units Final     CO2   Date Value Ref Range Status   03/04/2023 21.0 (L) 22.0 - 29.0 mmol/L Final         Imaging Results (Most Recent)     Procedure Component Value Units Date/Time    XR Chest 1 View [[id number]] Collected: 03/04/23 0719     Updated: 03/04/23 0817    Narrative:      EXAM: Chest, 1 AP view  CLINICAL INDICATION: Hypoxia, comparison    COMPARISON: Chest radiographs 2/27/2023    FINDINGS:    There are persistent interstitial/reticular opacities in both  lungs, noting interval worsening or progression of bilateral  bases.    There is no effusion, pneumothorax or cardiomegaly.    There is no acute or aggressive osseous lesion.      Impression:      CONCLUSION:    Progressive bilateral interstitial opacities which is concerning  for slightly worsening atypical pneumonia, pneumonitis or edema  on a background of chronic parenchymal scarring/fibrosis.    Electronically signed by:  Usman Stout DO  3/4/2023 8:15 AM CST  Workstation: [id number]           Blood Culture   Date Value Ref Range Status   02/27/2023 No growth at 5 days  Final   02/27/2023 No growth at 5 days  Final     No results found for: BCIDPCR, CXREFLEX, CSFCX, CULTURETIS  No results found for: CULTURES, HSVCX,  URCX  No results found for: EYECULTURE, GCCX, HSVCULTURE, LABHSV  No results found for: LEGIONELLA, MRSACX, MUMPSCX, MYCOPLASCX  No results found for: NOCARDIACX, STOOLCX  No results found for: THROATCX, UNSTIMCULT, URINECX, CULTURE, VZVCULTUR  No results found for: VIRALCULTU, WOUNDCX  Medication Reviewed and Will Continue Unless Documented in Plan:   vitamin C, 1,000 mg, Oral, Daily  atorvastatin, 20 mg, Oral, Nightly  budesonide-formoterol, 2 puff, Inhalation, BID - RT  cefepime, 1 g, Intravenous, Q12H  dextromethorphan polistirex ER, 60 mg, Oral, Q12H  dilTIAZem CD, 120 mg, Oral, Q24H  docusate sodium, 100 mg, Oral, BID  furosemide, 60 mg, Intravenous, Once  ipratropium, 2 puff, Inhalation, 4x Daily - RT  methylPREDNISolone sodium succinate, 40 mg, Intravenous, Q8H  mirtazapine, 15 mg, Oral, Nightly  pantoprazole, 40 mg, Oral, Q AM  rivaroxaban, 20 mg, Oral, Daily  sertraline, 100 mg, Oral, Daily  sodium chloride, 3 mL, Intravenous, Q12H  vancomycin, 15 mg/kg, Intravenous, Once         •  acetaminophen  •  aluminum-magnesium hydroxide-simethicone  •  benzonatate  •  bisacodyl  •  hydrALAZINE  •  ibuprofen  •  lactulose  •  LORazepam  •  melatonin  •  ondansetron  •  simethicone  •  sodium chloride  •  sodium chloride      Dietary Orders (From admission, onward)     Start     Ordered    03/03/23 1732  Diet: Regular/House Diet, Cardiac Diets; No Salt Packet; Texture: Regular Texture (IDDSI 7); Fluid Consistency: Thin (IDDSI 0)  Diet Effective Now        References:    Diet Order [place]   Question Answer Comment   Diets: Regular/House Diet    Diets: Cardiac Diets    Cardiac Diet: No Salt Packet    Texture: Regular Texture (IDDSI 7)    Fluid Consistency: Thin (IDDSI 0)        03/03/23 1734              History, physical exam, assessment and plan may have been partly or fully copied from before, but  changes made to the copied record to reflect care on the date of service. Part of the lab and imaging  reports auto  populated and corrected. Some of this note may be an electronic transcription of spoken  language to printed text. This may permit erroneous, or at times, nonsensical words or phrases to be  inadvertently transcribed. Although I have reviewed the note for such errors, some may still exist.      This document has been electronically signed by Bhanu Tamayo MD on March 4, 2023 13:39 CST

## 2023-03-04 NOTE — ACP (ADVANCE CARE PLANNING)
[facility] @ [facility]  INPATIENT HISTORY AND PHYSICAL NOTE    PATIENT NAME: Son Hughes Case      PHYSICIAN: Bhanu Tamayo MD  : 1950        MRN: [id number]  Patient Care Team:  Ethan Harris MD as PCP - General (Family Medicine)    Assessment      Acute on chronic hypoxic respiratory failure.  Respiratory failure secondary COVID-19 viral pneumonia.  Cytokeratin release syndrome grade 4.  Hypertension.  Atrial fibrillation on chronic anticoagulation.  Dyslipidemia.  Chronic obstructive pulmonary disease with possibility of oxygen at home.  DVT and GI prophylaxis.      DVT Prophylaxis: Xarelto.  GI Prophylaxis: Protonix.  Code Status: DNI.    Plan     -We will continue with oxygen supplementation with Airvo.  -We will continue with IV steroids.  -We will continue nebulizer treatment.  -We will try to titrate patient's oxygen as much as possible.  -Continue with PT/OT as tolerated.  -Continue with incentive spirometry.  -We will resume patient's medication previous hospitalization.  -We will try to get patient out of bed to chair as tolerated.  -DVT and GI prophylaxis in place.  -We'll continue monitoring patient in hospital setting and treat patient as course dictates.  -Please review orders for detailed plan of care.  -For Aute and Chronic medical condition we will continue medications described below in medication section which have been reviewed and we will continue unless changed in plan of care.  -Laboratory and diagnostic studies have been independently reviewed and the reports are reviewed as documented below.    Chief Complaint: I had shortness of breath.  History of Present Illness: 72-year-old gentleman with significant history of hypertension, dyslipidemia, atrial fibrillation on chronic anticoagulation presented to the emergency at [facility] with complaint of an shortness of breath.  Patient was admitted in [facility]  on 2023.  Patient was admitted for hypoxic respiratory failure with COVID-19 viral pneumonia.  Patient was started on steroids and nebulizer treatment.  Patient was given supplemental oxygenation.  Patient's condition did not improve as much as expected and patient remained in bed with high flow nasal cannula.  Patient was placed on Airvo which she had been tolerating.  Patient has been on long-term steroids as well.  Patient was subsequently recommended to be admitted to our facility for therapy and rehabilitation along with attempt to get patient off of high flow oxygen in interval.  Patient denies any complaints.  No chest pain or palpitation reported.  Patient does complain of exertional dyspnea.  No fever chills have been reported.  No headache blurry vision have been reported.  Patient said this has been going on for at least 10 days.  Each episode last for few hours.  CT scan done on  reviewed suggestive of emphysema with scattered interstitial opacities.  Patient did have COVID-19 viral pneumonia.  Blood work reviewed with ABG done on  suggest was Ph of 7.45 PCO2 of 29 PO2 of 52 on nonrebreather.      Past Medical History:   Diagnosis Date   • COPD (chronic obstructive pulmonary disease) (HCC)       Past Surgical History:   Procedure Laterality Date   • BACK SURGERY     • COLONOSCOPY N/A 2021    Procedure: COLONOSCOPY;  Surgeon: Micah Adkins MD;  Location: [facility] ENDOSCOPY;  Service: Gastroenterology;  Laterality: N/A;      No family history on file.   Social History     Socioeconomic History   • Marital status:    Tobacco Use   • Smoking status: Former     Packs/day: 1.50     Years: 47.00     Pack years: 70.50     Types: Cigarettes, Pipe, Cigars     Start date:      Quit date: 2000     Years since quittin.1   • Smokeless tobacco: Former     Types: Snuff   Vaping Use   • Vaping Use: Never used   Substance and Sexual Activity   • Alcohol use: Not  Currently   • Drug use: Never   • Sexual activity: Defer      Allergies   Allergen Reactions   • Contrast Dye (Echo Or Unknown Ct/Mr) Other (See Comments)     States made him sick   • Morphine Mental Status Change      Prior to Admission medications    Medication Sig Start Date End Date Taking? Authorizing Provider   albuterol sulfate  (90 Base) MCG/ACT inhaler Inhale 2 puffs 4 (Four) Times a Day. 3/3/23   Alpesh Larkin MD   aluminum-magnesium hydroxide-simethicone (MAALOX MAX) 400-400-40 MG/5ML suspension Take 15 mL by mouth Every 6 (Six) Hours As Needed for Heartburn. 3/3/23   Alpesh Larkin MD   benzonatate (TESSALON) 100 MG capsule Take 1 capsule by mouth 3 (Three) Times a Day As Needed for Cough. 3/3/23   Alpesh Larkin MD   bisacodyl (DULCOLAX) 10 MG suppository Insert 1 suppository into the rectum Daily As Needed for Constipation (Use if bisacodyl oral is ineffective). 3/3/23   Alpesh Larkin MD   bisacodyl (DULCOLAX) 5 MG EC tablet Take 1 tablet by mouth Daily As Needed for Constipation (Use if polyethylene glycol is ineffective). 3/3/23   Alpesh Larkin MD   budesonide-formoterol (SYMBICORT) 160-4.5 MCG/ACT inhaler Inhale 2 puffs 2 (Two) Times a Day.    Provider, MD Alejandro   dexamethasone (DECADRON) 4 MG/ML injection Infuse 1.5 mL into a venous catheter Daily for 2 doses. 3/4/23 3/6/23  Alpesh Larkin MD   dextromethorphan polistirex ER (DELSYM) 30 MG/5ML Suspension Extended Release oral suspension Take 10 mL by mouth Every 12 (Twelve) Hours. 3/3/23   Alpesh Larkin MD   dilTIAZem (TIAZAC) 120 MG 24 hr capsule Take 120 mg by mouth Daily.    ProviderAlejandro MD   ibuprofen (ADVIL,MOTRIN) 600 MG tablet Take 1 tablet by mouth Every 6 (Six) Hours As Needed for Fever. 3/3/23   Alpesh Larkin MD   ipratropium (ATROVENT HFA) 17 MCG/ACT inhaler Inhale 2 puffs 4 (Four) Times a Day. 3/3/23   Alpesh Larkin MD   LORazepam (ATIVAN) 2 MG/ML injection Infuse 0.5 mL  into a venous catheter Every 6 (Six) Hours As Needed for Anxiety for up to 5 days. 3/3/23 3/8/23  Alpesh Larkin MD   melatonin 5 MG tablet tablet Take 1 tablet by mouth At Night As Needed (insomnia). 3/3/23   Alpesh Larkin MD   metoclopramide (REGLAN) 5 MG/ML injection Infuse 1 mL into a venous catheter Every 6 (Six) Hours As Needed for Vomiting or Nausea. 3/3/23   Alpesh Larkin MD   mirtazapine (REMERON) 15 MG tablet Take 1 tablet by mouth Every Night. 3/3/23   Alpesh Larkin MD   ondansetron (ZOFRAN) 2 mg/mL injection Infuse 2 mL into a venous catheter Every 6 (Six) Hours As Needed for Nausea or Vomiting. 3/3/23   Alpesh Larkin MD   ondansetron (ZOFRAN) 4 MG tablet Take 1 tablet by mouth Every 6 (Six) Hours As Needed for Nausea or Vomiting. 3/3/23   Alpesh Larkin MD   pantoprazole (PROTONIX) 40 MG EC tablet Take 1 tablet by mouth Every Morning. 3/4/23   Alpesh Larkin MD   polyethylene glycol (MIRALAX) 17 g packet Take 17 g by mouth Daily As Needed (Use if senna-docusate is ineffective). 3/3/23   Alpesh Larkin MD   rivaroxaban (XARELTO) 20 MG tablet Take 20 mg by mouth Daily.    ProviderAlejandro MD   sennosides-docusate (PERICOLACE) 8.6-50 MG per tablet Take 2 tablets by mouth 2 (Two) Times a Day As Needed for Constipation. 3/3/23   Alpesh Larkin MD   sertraline (ZOLOFT) 100 MG tablet Take 1 tablet by mouth once daily 12/10/21   [place] APRN   simvastatin (ZOCOR) 80 MG tablet Take 40 mg by mouth Every Night.    ProviderAlejandro MD   sodium chloride 0.9 % solution Infuse 40 mL into a venous catheter As Needed (line care). 3/3/23   Alpesh Larkin MD   vitamin C (ASCORBIC ACID) 500 MG tablet Take 1,000 mg by mouth Daily.    ProviderAlejandro MD     Current Medications:   vitamin C, 1,000 mg, Oral, Daily  atorvastatin, 20 mg, Oral, Nightly  budesonide-formoterol, 2 puff, Inhalation, BID - RT  cefepime, 1 g, Intravenous, Q12H  dextromethorphan  polistirex ER, 60 mg, Oral, Q12H  dilTIAZem CD, 120 mg, Oral, Q24H  docusate sodium, 100 mg, Oral, BID  furosemide, 60 mg, Intravenous, Once  ipratropium, 2 puff, Inhalation, 4x Daily - RT  methylPREDNISolone sodium succinate, 40 mg, Intravenous, Q8H  mirtazapine, 15 mg, Oral, Nightly  pantoprazole, 40 mg, Oral, Q AM  rivaroxaban, 20 mg, Oral, Daily  sertraline, 100 mg, Oral, Daily  sodium chloride, 3 mL, Intravenous, Q12H  vancomycin, 15 mg/kg, Intravenous, Once       •  acetaminophen  •  aluminum-magnesium hydroxide-simethicone  •  benzonatate  •  bisacodyl  •  hydrALAZINE  •  ibuprofen  •  lactulose  •  LORazepam  •  melatonin  •  ondansetron  •  simethicone  •  sodium chloride  •  sodium chloride       Review of Systems:    Review of Systems   Unable to perform ROS: Mental status change       Objective   Vital Signs  Temp: 97.7 F         Heart Rate: 97         Resp: 28          Blood Pressure: 132/73         Pulse Ox: 94 %    Physical Exam:   Physical Exam  Vitals and nursing note reviewed.   Constitutional:       General: He is in acute distress.      Appearance: He is well-developed. He is ill-appearing.   HENT:      Head: Normocephalic and atraumatic.      Nose: Nose normal.   Eyes:      Conjunctiva/sclera: Conjunctivae normal.      Pupils: Pupils are equal, round, and reactive to light.   Neck:      Thyroid: No thyromegaly.      Vascular: No JVD.      Trachea: No tracheal deviation.   Cardiovascular:      Rate and Rhythm: Regular rhythm. Tachycardia present.      Pulses: Normal pulses.      Heart sounds: Normal heart sounds.   Pulmonary:      Effort: Respiratory distress present.      Breath sounds: Rales present. No wheezing.   Chest:      Chest wall: No tenderness.   Abdominal:      General: Bowel sounds are normal. There is no distension.      Palpations: Abdomen is soft.      Tenderness: There is no abdominal tenderness. There is no guarding or rebound.   Musculoskeletal:         General: Normal range of  motion.      Cervical back: Normal range of motion and neck supple.      Right lower leg: Edema present.      Left lower leg: Edema present.   Lymphadenopathy:      Cervical: No cervical adenopathy.   Skin:     General: Skin is warm and dry.      Capillary Refill: Capillary refill takes 2 to 3 seconds.      Comments: Intact   Neurological:      General: No focal deficit present.      Mental Status: He is alert. He is disoriented.      Cranial Nerves: No cranial nerve deficit.      Deep Tendon Reflexes: Reflexes are normal and symmetric.       Results Review:    I have personally reviewed current lab, radiology, and diagnostic data and agree with results.  Results from last 7 days   Lab Units 03/04/23  0635 03/03/23  0334 03/02/23  0551 03/01/23  0333 02/28/23  0421 02/27/23  0850 02/26/23  0650   SODIUM mmol/L 138 134* 138 135* 135* 134* 136   POTASSIUM mmol/L 4.7 4.5 4.8 4.6 4.7 4.5 5.1   CHLORIDE mmol/L 106 103 106 104 102 102 104   CO2 mmol/L 21.0* 22.0 24.0 22.0 25.0 22.0 24.0   BUN mg/dL 36* 39* 40* 38* 39* 41* 40*   CREATININE mg/dL 1.36* 1.36* 1.34* 1.35* 1.53* 1.66* 1.64*   GLUCOSE mg/dL 110* 114* 104* 125* 129* 111* 114*   CALCIUM mg/dL 8.9 8.9 8.6 8.6 8.6 8.7 8.7   BILIRUBIN mg/dL 0.6  --   --   --   --  0.5 0.4   ALK PHOS U/L 96  --   --   --   --  67 68   ALT (SGPT) U/L 103*  --   --   --   --  30 26   AST (SGOT) U/L 36  --   --   --   --  30 26     Results from last 7 days   Lab Units 03/04/23  0635   MAGNESIUM mg/dL 2.2     Results from last 7 days   Lab Units 03/04/23  0635 03/03/23  0334 03/02/23  0551 03/01/23  0333 02/28/23  0421 02/27/23  0850 02/26/23  0650   WBC 10*3/mm3 14.38* 10.69 9.71 9.39 10.32 12.06* 9.08   HEMOGLOBIN g/dL 13.7 13.2 13.1 12.9* 12.9* 14.1 14.0   HEMATOCRIT % 40.3 38.7 39.0 37.3* 38.8 41.2 40.1   PLATELETS 10*3/mm3 235 238 253 224 197 242 196     Lab Results   Component Value Date    TROPONINT 12 02/23/2023     pH, Arterial   Date Value Ref Range Status   03/04/2023 7.446  7.350 - 7.450 pH units Final     CO2   Date Value Ref Range Status   03/04/2023 21.0 (L) 22.0 - 29.0 mmol/L Final         Imaging Results (Most Recent)     Procedure Component Value Units Date/Time    XR Chest 1 View [[id number]] Collected: 03/04/23 0719     Updated: 03/04/23 0817    Narrative:      EXAM: Chest, 1 AP view  CLINICAL INDICATION: Hypoxia, comparison    COMPARISON: Chest radiographs 2/27/2023    FINDINGS:    There are persistent interstitial/reticular opacities in both  lungs, noting interval worsening or progression of bilateral  bases.    There is no effusion, pneumothorax or cardiomegaly.    There is no acute or aggressive osseous lesion.      Impression:      CONCLUSION:    Progressive bilateral interstitial opacities which is concerning  for slightly worsening atypical pneumonia, pneumonitis or edema  on a background of chronic parenchymal scarring/fibrosis.    Electronically signed by:  Usman Stout DO  3/4/2023 8:15 AM CST  Workstation: [id number]        Blood Culture   Date Value Ref Range Status   02/27/2023 No growth at 5 days  Final   02/27/2023 No growth at 5 days  Final     No results found for: BCIDPCR, CXREFLEX, CSFCX, CULTURETIS  No results found for: CULTURES, HSVCX, URCX  No results found for: EYECULTURE, GCCX, HSVCULTURE, LABHSV  No results found for: LEGIONELLA, MRSACX, MUMPSCX, MYCOPLASCX  No results found for: NOCARDIACX, STOOLCX  No results found for: THROATCX, UNSTIMCULT, URINECX, CULTURE, VZVCULTUR  No results found for: VIRALCULTU, WOUNDCX  Dietary Orders (From admission, onward)     Start     Ordered    03/03/23 2873  Diet: Regular/House Diet, Cardiac Diets; No Salt Packet; Texture: Regular Texture (IDDSI 7); Fluid Consistency: Thin (IDDSI 0)  Diet Effective Now        References:    Diet Order Crosswalk   Question Answer Comment   Diets: Regular/House Diet    Diets: Cardiac Diets    Cardiac Diet: No Salt Packet    Texture: Regular Texture (IDDSI 7)    Fluid Consistency: Thin  (IDDSI 0)        03/03/23 9400                Total Time Spent: 73 minutes.  History, physical exam, assessment and plan may have been partly or fully copied from before, but  changes made to the copied record to reflect care on the date of service. Part of the lab and imaging  reports auto populated and corrected. Some of this note may be an electronic transcription of spoken  language to printed text. This may permit erroneous, or at times, nonsensical words or phrases to be  inadvertently transcribed. Although I have reviewed the note for such errors, some may still exist.      This document has been electronically signed by Bhanu Tamayo MD.

## 2023-03-05 NOTE — ACP (ADVANCE CARE PLANNING)
[facility], located within [facility] @ [facility]  INPATIENT PROGRESS NOTE    PATIENT NAME: Son Hughes Case      PHYSICIAN: Bhanu Tamayo MD  : 1950        MRN: [id number]  Patient Care Team:  Ethan Harris MD as PCP - General (Family Medicine)    Chief Complaint: I had shortness of breath.  History of Present Illness: 72-year-old gentleman with significant history of hypertension, dyslipidemia, atrial fibrillation on chronic anticoagulation presented to the emergency at [facility] with complaint of an shortness of breath.  Patient was admitted in [facility] on 2023.  Patient was admitted for hypoxic respiratory failure with COVID-19 viral pneumonia.  Patient was started on steroids and nebulizer treatment.  Patient was given supplemental oxygenation.  Patient's condition did not improve as much as expected and patient remained in bed with high flow nasal cannula.  Patient was placed on Airvo which she had been tolerating.  Patient has been on long-term steroids as well.  Patient was subsequently recommended to be admitted to our facility for therapy and rehabilitation along with attempt to get patient off of high flow oxygen in interval.  Patient denies any complaints.  No chest pain or palpitation reported.  Patient does complain of exertional dyspnea.  No fever chills have been reported.  No headache blurry vision have been reported.  Patient said this has been going on for at least 10 days.  Each episode last for few hours.  CT scan done on  reviewed suggestive of emphysema with scattered interstitial opacities.  Patient did have COVID-19 viral pneumonia.  Blood work reviewed with ABG done on  suggest was Ph of 7.45 PCO2 of 29 PO2 of 52 on nonrebreather.    Assessment      Acute on chronic hypoxic respiratory failure.  Respiratory failure secondary COVID-19 viral pneumonia.  Cytokeratin release syndrome grade  4.  Hypertension.  Atrial fibrillation on chronic anticoagulation.  Dyslipidemia.  Chronic obstructive pulmonary disease with possibility of oxygen at home.  DVT and GI prophylaxis.        DVT Prophylaxis: Xarelto.  GI Prophylaxis: Protonix.  Code Status: DNI.    Plan     -Patient has been taken off of BiPAP as per family's request.  -Tolerating Airvo.  -Patient oxygen saturation remains 85%.  -Overnight events noted.  -Continue oxygen supplementation and steroids.  -Canalized treatments.  -Diuretics to be continued.  -Family aware of diagnosis and poor prognosis.,  They are to consider comfort care.  -DVT and GI prophylaxis in place.  -We'll continue monitoring patient in hospital setting and treat patient as course dictates.  -Please review orders for detailed plan of care.  -For Aute and Chronic medical condition we will continue medications described below in medication section which have been reviewed and we will continue unless changed in plan of care.  -Laboratory and diagnostic studies have been independently reviewed and the reports are reviewed as documented below.    Subjective   Interval History:   Patient Complaints:  Patient seen and examined.  Resting comfortably AirVo.  Overnight events noted.  History taken from: Review, nursing staff.    Review of Systems:    Review of Systems   Unable to perform ROS: Mental status change       Objective   Vital Signs:  Temp: 97.8 F         Heart Rate: 87         Resp: 40          Blood Pressure: 112/68         Pulse Ox: 85 %    Physical Exam:   Physical Exam  Vitals and nursing note reviewed.   Constitutional:       General: He is in acute distress.      Appearance: He is well-developed. He is ill-appearing.   HENT:      Head: Normocephalic and atraumatic.      Nose: Nose normal.   Eyes:      Conjunctiva/sclera: Conjunctivae normal.      Pupils: Pupils are equal, round, and reactive to light.   Neck:      Thyroid: No thyromegaly.      Vascular: No JVD.       Trachea: No tracheal deviation.   Cardiovascular:      Rate and Rhythm: Regular rhythm. Tachycardia present.      Pulses: Normal pulses.      Heart sounds: Normal heart sounds.   Pulmonary:      Effort: Respiratory distress present.      Breath sounds: Rales present. No wheezing.   Chest:      Chest wall: No tenderness.   Abdominal:      General: Bowel sounds are normal. There is no distension.      Palpations: Abdomen is soft.      Tenderness: There is no abdominal tenderness. There is no guarding or rebound.   Musculoskeletal:         General: Normal range of motion.      Cervical back: Normal range of motion and neck supple.      Right lower leg: Edema present.      Left lower leg: Edema present.   Lymphadenopathy:      Cervical: No cervical adenopathy.   Skin:     General: Skin is warm and dry.      Capillary Refill: Capillary refill takes 2 to 3 seconds.      Comments: Intact   Neurological:      General: No focal deficit present.      Mental Status: He is alert. He is disoriented.      Cranial Nerves: No cranial nerve deficit.      Deep Tendon Reflexes: Reflexes are normal and symmetric.       Results Review:       Results from last 7 days   Lab Units 03/04/23  0635 03/03/23  0334 03/02/23  0551 03/01/23  0333 02/28/23  0421 02/27/23  0850   SODIUM mmol/L 138 134* 138 135* 135* 134*   POTASSIUM mmol/L 4.7 4.5 4.8 4.6 4.7 4.5   CHLORIDE mmol/L 106 103 106 104 102 102   CO2 mmol/L 21.0* 22.0 24.0 22.0 25.0 22.0   BUN mg/dL 36* 39* 40* 38* 39* 41*   CREATININE mg/dL 1.36* 1.36* 1.34* 1.35* 1.53* 1.66*   GLUCOSE mg/dL 110* 114* 104* 125* 129* 111*   CALCIUM mg/dL 8.9 8.9 8.6 8.6 8.6 8.7   BILIRUBIN mg/dL 0.6  --   --   --   --  0.5   ALK PHOS U/L 96  --   --   --   --  67   ALT (SGPT) U/L 103*  --   --   --   --  30   AST (SGOT) U/L 36  --   --   --   --  30     Results from last 7 days   Lab Units 03/04/23  0635   MAGNESIUM mg/dL 2.2     Results from last 7 days   Lab Units 03/04/23  0635 03/03/23  0332  03/02/23  0551 03/01/23  0333 02/28/23  0421 02/27/23  0850   WBC 10*3/mm3 14.38* 10.69 9.71 9.39 10.32 12.06*   HEMOGLOBIN g/dL 13.7 13.2 13.1 12.9* 12.9* 14.1   HEMATOCRIT % 40.3 38.7 39.0 37.3* 38.8 41.2   PLATELETS 10*3/mm3 235 238 253 224 197 242     Lab Results   Component Value Date    TROPONINT 12 02/23/2023     pH, Arterial   Date Value Ref Range Status   03/04/2023 7.446 7.350 - 7.450 pH units Final     CO2   Date Value Ref Range Status   03/04/2023 21.0 (L) 22.0 - 29.0 mmol/L Final         Imaging Results (Most Recent)     Procedure Component Value Units Date/Time    XR Chest 1 View [[id number]] Collected: 03/04/23 0719     Updated: 03/04/23 0817    Narrative:      EXAM: Chest, 1 AP view  CLINICAL INDICATION: Hypoxia, comparison    COMPARISON: Chest radiographs 2/27/2023    FINDINGS:    There are persistent interstitial/reticular opacities in both  lungs, noting interval worsening or progression of bilateral  bases.    There is no effusion, pneumothorax or cardiomegaly.    There is no acute or aggressive osseous lesion.      Impression:      CONCLUSION:    Progressive bilateral interstitial opacities which is concerning  for slightly worsening atypical pneumonia, pneumonitis or edema  on a background of chronic parenchymal scarring/fibrosis.    Electronically signed by:  Usman Stout DO  3/4/2023 8:15 AM CST  Workstation: [id number]           Blood Culture   Date Value Ref Range Status   02/27/2023 No growth at 5 days  Final   02/27/2023 No growth at 5 days  Final     No results found for: BCIDPCR, CXREFLEX, CSFCX, CULTURETIS  No results found for: CULTURES, HSVCX, URCX  No results found for: EYECULTURE, GCCX, HSVCULTURE, LABHSV  No results found for: LEGIONELLA, MRSACX, MUMPSCX, MYCOPLASCX  No results found for: NOCARDIACX, STOOLCX  No results found for: THROATCX, UNSTIMCULT, URINECX, CULTURE, VZVCULTUR  No results found for: VIRALCULTU, WOUNDCX  Medication Reviewed and Will Continue Unless Documented in  Plan:   vitamin C, 1,000 mg, Oral, Daily  atorvastatin, 20 mg, Oral, Nightly  budesonide-formoterol, 2 puff, Inhalation, BID - RT  cefepime, 2 g, Intravenous, Once  cefepime, 2 g, Intravenous, Q8H  dextromethorphan polistirex ER, 60 mg, Oral, Q12H  dilTIAZem CD, 120 mg, Oral, Q24H  docusate sodium, 100 mg, Oral, BID  furosemide, 60 mg, Intravenous, Once  ipratropium, 2 puff, Inhalation, 4x Daily - RT  methylPREDNISolone sodium succinate, 40 mg, Intravenous, Q8H  mirtazapine, 15 mg, Oral, Nightly  pantoprazole, 40 mg, Oral, Q AM  rivaroxaban, 20 mg, Oral, Daily  sertraline, 100 mg, Oral, Daily  sodium chloride, 3 mL, Intravenous, Q12H  vancomycin, 15 mg/kg, Intravenous, Once         •  acetaminophen  •  aluminum-magnesium hydroxide-simethicone  •  benzonatate  •  bisacodyl  •  hydrALAZINE  •  ibuprofen  •  lactulose  •  LORazepam  •  melatonin  •  ondansetron  •  simethicone  •  sodium chloride  •  sodium chloride      Dietary Orders (From admission, onward)     Start     Ordered    03/03/23 1732  Diet: Regular/House Diet, Cardiac Diets; No Salt Packet; Texture: Regular Texture (IDDSI 7); Fluid Consistency: Thin (IDDSI 0)  Diet Effective Now        References:    Diet Order Crosswalk   Question Answer Comment   Diets: Regular/House Diet    Diets: Cardiac Diets    Cardiac Diet: No Salt Packet    Texture: Regular Texture (IDDSI 7)    Fluid Consistency: Thin (IDDSI 0)        03/03/23 1734              History, physical exam, assessment and plan may have been partly or fully copied from before, but  changes made to the copied record to reflect care on the date of service. Part of the lab and imaging  reports auto populated and corrected. Some of this note may be an electronic transcription of spoken  language to printed text. This may permit erroneous, or at times, nonsensical words or phrases to be  inadvertently transcribed. Although I have reviewed the note for such errors, some may still exist.      This document has  been electronically signed by Bhanu Tamayo MD on March 5, 2023 12:21 CST

## 2023-03-05 NOTE — SIGNIFICANT NOTE
03/05/23 0758   OTHER   Discipline occupational therapist;physical therapist   Rehab Time/Intention   Session Not Performed unable to evaluate, medical status change;other (see comments)  (RN deffered therapy today and reports patient may go on comfort measures. Therapy will f/u as able)   Recommendation   PT - Next Appointment 03/06/23   Recommendation   OT - Next Appointment 03/06/23

## 2023-03-20 NOTE — ACP (ADVANCE CARE PLANNING)
[facility] @ [facility]  DISCHARGE SUMMARY     PATIENT NAME: Son Hughes Case      PHYSICIAN: Bhanu Tamayo MD  : 1950        MRN: [id number]  Patient Care Team:  Ethan Harris MD as PCP - General (Family Medicine)    Date of Admission: 3/3/2023  Date of Death:  2023 at approximately 1928  Primary Care Physician: Ethan Harris MD    Presenting Problem/History of Present Illness:  Chief Complaint: I had shortness of breath.  History of Present Illness: 72-year-old gentleman with significant history of hypertension, dyslipidemia, atrial fibrillation on chronic anticoagulation presented to the emergency at [facility] with complaint of an shortness of breath.  Patient was admitted in [facility] on 2023.  Patient was admitted for hypoxic respiratory failure with COVID-19 viral pneumonia.  Patient was started on steroids and nebulizer treatment.  Patient was given supplemental oxygenation.  Patient's condition did not improve as much as expected and patient remained in bed with high flow nasal cannula.  Patient was placed on Airvo which she had been tolerating.  Patient has been on long-term steroids as well.  Patient was subsequently recommended to be admitted to our facility for therapy and rehabilitation along with attempt to get patient off of high flow oxygen in interval.  Patient denies any complaints.  No chest pain or palpitation reported.  Patient does complain of exertional dyspnea.  No fever chills have been reported.  No headache blurry vision have been reported.  Patient said this has been going on for at least 10 days.  Each episode last for few hours.  CT scan done on  reviewed suggestive of emphysema with scattered interstitial opacities.  Patient did have COVID-19 viral pneumonia.  Blood work reviewed with ABG done on  suggest was Ph of 7.45 PCO2 of 29 PO2 of 52 on  nonrebreather.     Final Death Diagnoses:  Acute on chronic hypoxic respiratory failure.  Respiratory failure secondary COVID-19 viral pneumonia.  Cytokeratin release syndrome grade 4.  Hypertension.  Atrial fibrillation on chronic anticoagulation.  Dyslipidemia.  Chronic obstructive pulmonary disease with possibility of oxygen at home.  DVT and GI prophylaxis.    Consults:   Pulmonology    Procedures Performed: None    Pertinent Test Results: See medical record for all labs and diagnostics.    Hospital Course:  The patient is a 72 y.o. male who presented to our facility for high flow oxygen weaning and rehabilitation prior to returning home.  He was admitted to us from [facility].  He was diagnosed with hypoxic respiratory failure with viral Covid 19 pneumonia. He received bronchodilators, steroids, and oxygenation, however, he did not clinically improve.  While at this facility, patient medications and oxygenation continued.  Patient continued with respiratory distress despite all efforts.  Due to his comorbidities and respiratory failure, all medical attempts were futile and patient continued with a poor prognosis.  Family chose to take patient off of Bipap and he was able to tolerate AirVo with O2 sats remaining in high 80's.  Patient family considered comfort care for patient given his diagnosis and poor prognosis.  Patient was noted to present with no HR, resp, or B/P, and  on 2023 at approximately 1928PM.              Bhanu Tamayo MD  23  14:28 CDT    Time: 39  History, physical exam, assessment and plan may have been partly or fully copied from before, but  changes made to the copied record to reflect care on the date of service. Part of the lab and imaging  reports auto populated and corrected. Some of this note may be an electronic transcription of spoken  language to printed text. This may permit erroneous, or at times, nonsensical words or phrases to be  inadvertently transcribed.  Although I have reviewed the note for such errors, some may still exist.      This document has been electronically signed by Bhanu Tamayo MD on March 20, 2023 14:28 CDT